# Patient Record
Sex: FEMALE | URBAN - METROPOLITAN AREA
[De-identification: names, ages, dates, MRNs, and addresses within clinical notes are randomized per-mention and may not be internally consistent; named-entity substitution may affect disease eponyms.]

---

## 2018-10-19 ENCOUNTER — APPOINTMENT (RX ONLY)
Dept: URBAN - METROPOLITAN AREA CLINIC 116 | Facility: CLINIC | Age: 78
Setting detail: DERMATOLOGY
End: 2018-10-19

## 2018-10-19 DIAGNOSIS — L82.1 OTHER SEBORRHEIC KERATOSIS: ICD-10-CM

## 2018-10-19 PROCEDURE — ? COUNSELING

## 2018-10-19 PROCEDURE — 99202 OFFICE O/P NEW SF 15 MIN: CPT

## 2018-10-19 ASSESSMENT — LOCATION DETAILED DESCRIPTION DERM: LOCATION DETAILED: RIGHT SUPERIOR MEDIAL MIDBACK

## 2018-10-19 ASSESSMENT — LOCATION SIMPLE DESCRIPTION DERM: LOCATION SIMPLE: RIGHT LOWER BACK

## 2018-10-19 ASSESSMENT — LOCATION ZONE DERM: LOCATION ZONE: TRUNK

## 2021-01-01 ENCOUNTER — OFFICE VISIT (OUTPATIENT)
Age: 81
End: 2021-01-01

## 2021-02-01 ENCOUNTER — OFFICE VISIT (OUTPATIENT)
Age: 81
End: 2021-02-01

## 2021-09-01 ENCOUNTER — OFFICE VISIT (OUTPATIENT)
Age: 81
End: 2021-09-01

## 2021-10-12 ENCOUNTER — OFFICE VISIT (OUTPATIENT)
Dept: URBAN - METROPOLITAN AREA CLINIC 9 | Facility: CLINIC | Age: 81
End: 2021-10-12

## 2021-12-07 ENCOUNTER — OFFICE VISIT (OUTPATIENT)
Dept: URBAN - METROPOLITAN AREA CLINIC 9 | Facility: CLINIC | Age: 81
End: 2021-12-07

## 2022-07-30 ENCOUNTER — TELEPHONE ENCOUNTER (OUTPATIENT)
Age: 82
End: 2022-07-30

## 2022-07-31 ENCOUNTER — TELEPHONE ENCOUNTER (OUTPATIENT)
Age: 82
End: 2022-07-31

## 2022-07-31 RX ORDER — FAMOTIDINE 20 MG/1
1 TABLET ORAL
Qty: 0 | Refills: 5 | Status: ACTIVE | COMMUNITY
Start: 2021-12-07

## 2022-07-31 RX ORDER — FAMOTIDINE 20 MG/1
1 TABLET ORAL DAILY
Qty: 0 | Refills: 5 | Status: ACTIVE | COMMUNITY

## 2022-07-31 RX ORDER — FAMOTIDINE 20 MG/1
1 TABLET ORAL DAILY
Qty: 0 | Refills: 5 | Status: ACTIVE | COMMUNITY
Start: 2021-10-12

## 2022-07-31 RX ORDER — SUCRALFATE 1 G/10ML
10 SUSPENSION ORAL
Qty: 0 | Refills: 2 | Status: ACTIVE | COMMUNITY
Start: 2021-12-07

## 2022-10-20 ENCOUNTER — TELEPHONE ENCOUNTER (OUTPATIENT)
Dept: URBAN - METROPOLITAN AREA CLINIC 7 | Facility: CLINIC | Age: 82
End: 2022-10-20

## 2023-04-18 ENCOUNTER — TELEMEDICINE (OUTPATIENT)
Dept: PRIMARY CARE | Facility: CLINIC | Age: 83
End: 2023-04-18
Payer: MEDICARE

## 2023-04-18 DIAGNOSIS — R19.7 DIARRHEA, UNSPECIFIED TYPE: Primary | ICD-10-CM

## 2023-04-18 PROCEDURE — 99213 OFFICE O/P EST LOW 20 MIN: CPT | Performed by: STUDENT IN AN ORGANIZED HEALTH CARE EDUCATION/TRAINING PROGRAM

## 2023-04-18 RX ORDER — MEMANTINE HYDROCHLORIDE 10 MG/1
TABLET ORAL 2 TIMES DAILY
COMMUNITY
Start: 2017-07-06 | End: 2023-07-25

## 2023-04-18 RX ORDER — OMEPRAZOLE 20 MG/1
1 CAPSULE, DELAYED RELEASE ORAL DAILY
COMMUNITY
Start: 2022-06-01 | End: 2023-06-20

## 2023-04-18 RX ORDER — LEVOTHYROXINE SODIUM 25 UG/1
1 TABLET ORAL DAILY
COMMUNITY
Start: 2014-04-09 | End: 2023-07-25

## 2023-04-18 RX ORDER — DONEPEZIL HYDROCHLORIDE 10 MG/1
1 TABLET, FILM COATED ORAL DAILY
COMMUNITY
Start: 2019-04-08 | End: 2023-09-21 | Stop reason: SDUPTHER

## 2023-04-18 RX ORDER — ATORVASTATIN CALCIUM 10 MG/1
1 TABLET, FILM COATED ORAL DAILY
COMMUNITY
Start: 2014-09-04 | End: 2023-09-21 | Stop reason: SDUPTHER

## 2023-04-18 RX ORDER — LORATADINE 10 MG/1
1 TABLET ORAL DAILY
COMMUNITY
Start: 2019-09-16

## 2023-04-18 ASSESSMENT — ENCOUNTER SYMPTOMS
CONSTIPATION: 0
FEVER: 0
RHINORRHEA: 0
DIZZINESS: 0
ABDOMINAL PAIN: 0
HEADACHES: 0
CHILLS: 0
SHORTNESS OF BREATH: 0
PALPITATIONS: 0
VOMITING: 0
NAUSEA: 0
FATIGUE: 0
DIARRHEA: 1
DYSURIA: 0
COUGH: 0
LIGHT-HEADEDNESS: 0

## 2023-04-18 NOTE — PROGRESS NOTES
Subjective   Patient ID: Salina Malik is a 83 y.o. female who presents for Diarrhea (Started last night,  getting worse ).    Diarrhea   Pertinent negatives include no abdominal pain, chills, coughing, fever, headaches or vomiting.        She has been having some stomach ache and then some diarrhea most of the day today.  Last night she took her medication with some mouthwash that she accidentally took them with.  Her  gave her one of his hyoscyamine tablets which have not seemed to help  She has had multiple episodes of diarrhea so far today.      Review of Systems   Constitutional:  Negative for chills, fatigue and fever.   HENT:  Negative for congestion and rhinorrhea.    Respiratory:  Negative for cough and shortness of breath.    Cardiovascular:  Negative for chest pain and palpitations.   Gastrointestinal:  Positive for diarrhea. Negative for abdominal pain, constipation, nausea and vomiting.   Genitourinary:  Negative for decreased urine volume and dysuria.   Neurological:  Negative for dizziness, light-headedness and headaches.       Objective   There were no vitals taken for this visit.    Physical Exam    Assessment/Plan   Problem List Items Addressed This Visit    None  Visit Diagnoses       Diarrhea, unspecified type    -  Primary          Patient with unspecified diarrhea.  No recent antibiotic use or other risk factors for C. difficile that would need to be tested.   states that he has not been having her drink as much fluids as it seems to go right through her due to the diarrhea.  Encouraged adequate hydration and discussed hydration formulas particularly with Pedialyte over-the-counter to keep up with fluid losses.  Discussed that if she is not able to take anything in by mouth he would need to go to the emergency department so she could receive IV fluids.  Discussed dietary changes that can be made to help with her diarrhea.  She is not having any blood or mucus in her stool.   Discussed over-the-counter fiber supplementation as well to help bulk up the stool as well as certain foods to avoid.  Discussed that they can start with over-the-counter treatment with Pepto-Bismol and Imodium if they need to but would forego this for now.  They did not want any prescription management at this time.  Discussed signs and symptoms and worsening diarrhea that would require immediate attention in the emergency department versus reevaluation in the office.  They are understanding and agreeable with this plan.    This visit was completed via telemedicine (video/telephone)call due to the restrictions of the COVID global pandemic. All issues were discussed and addressed as in the clinical documentation, but no physical exam was performed. If it was felt that the patient should be evaluated in a clinical setting, then they were directed there. The patient verbally consented to the telehealth visit.  Spent 15 minutes with the patient over the telemedicine call and more than 50% of this time was spent in counseling and coordination of care.

## 2023-04-25 ENCOUNTER — OFFICE VISIT (OUTPATIENT)
Dept: PRIMARY CARE | Facility: CLINIC | Age: 83
End: 2023-04-25
Payer: MEDICARE

## 2023-04-25 ENCOUNTER — TELEPHONE (OUTPATIENT)
Dept: PRIMARY CARE | Facility: CLINIC | Age: 83
End: 2023-04-25

## 2023-04-25 VITALS
HEIGHT: 63 IN | SYSTOLIC BLOOD PRESSURE: 128 MMHG | HEART RATE: 76 BPM | DIASTOLIC BLOOD PRESSURE: 70 MMHG | WEIGHT: 164 LBS | OXYGEN SATURATION: 97 % | BODY MASS INDEX: 29.06 KG/M2 | TEMPERATURE: 96.5 F

## 2023-04-25 DIAGNOSIS — B37.2 CANDIDAL DERMATITIS: Primary | ICD-10-CM

## 2023-04-25 DIAGNOSIS — R73.09 ELEVATED GLUCOSE: ICD-10-CM

## 2023-04-25 DIAGNOSIS — F03.918 DEMENTIA WITH BEHAVIORAL DISTURBANCE (MULTI): ICD-10-CM

## 2023-04-25 DIAGNOSIS — K21.9 GASTROESOPHAGEAL REFLUX DISEASE WITHOUT ESOPHAGITIS: ICD-10-CM

## 2023-04-25 DIAGNOSIS — E78.5 HYPERLIPIDEMIA, UNSPECIFIED HYPERLIPIDEMIA TYPE: ICD-10-CM

## 2023-04-25 DIAGNOSIS — N30.00 ACUTE CYSTITIS WITHOUT HEMATURIA: ICD-10-CM

## 2023-04-25 DIAGNOSIS — E03.9 HYPOTHYROIDISM, UNSPECIFIED TYPE: ICD-10-CM

## 2023-04-25 PROBLEM — F03.90 DEMENTIA (MULTI): Status: ACTIVE | Noted: 2023-04-25

## 2023-04-25 PROBLEM — D17.1 LIPOMA OF TORSO: Status: ACTIVE | Noted: 2018-06-21

## 2023-04-25 PROBLEM — Z96.642 HISTORY OF LEFT HIP HEMIARTHROPLASTY: Status: ACTIVE | Noted: 2020-06-03

## 2023-04-25 PROBLEM — G30.9 ALZHEIMER'S DEMENTIA WITH BEHAVIORAL DISTURBANCE (MULTI): Status: ACTIVE | Noted: 2019-04-26

## 2023-04-25 PROBLEM — N39.0 UTI (URINARY TRACT INFECTION): Status: ACTIVE | Noted: 2023-04-25

## 2023-04-25 PROBLEM — L60.0 INGROWING NAIL: Status: ACTIVE | Noted: 2023-04-25

## 2023-04-25 PROBLEM — M85.80 OSTEOPENIA: Status: ACTIVE | Noted: 2023-04-25

## 2023-04-25 PROBLEM — N60.12 FIBROCYSTIC BREAST CHANGES OF BOTH BREASTS: Status: ACTIVE | Noted: 2017-09-12

## 2023-04-25 PROBLEM — N63.20 LEFT BREAST LUMP: Status: ACTIVE | Noted: 2023-04-25

## 2023-04-25 PROBLEM — E03.8 OTHER SPECIFIED HYPOTHYROIDISM: Status: ACTIVE | Noted: 2018-11-15

## 2023-04-25 PROBLEM — K31.7 GASTRIC POLYPS: Status: ACTIVE | Noted: 2023-04-25

## 2023-04-25 PROBLEM — N60.11 FIBROCYSTIC BREAST CHANGES OF BOTH BREASTS: Status: ACTIVE | Noted: 2017-09-12

## 2023-04-25 PROBLEM — F02.818 ALZHEIMER'S DEMENTIA WITH BEHAVIORAL DISTURBANCE (MULTI): Status: ACTIVE | Noted: 2019-04-26

## 2023-04-25 PROBLEM — K59.00 CONSTIPATION: Status: ACTIVE | Noted: 2018-11-15

## 2023-04-25 PROBLEM — J18.9 PNEUMONIA: Status: ACTIVE | Noted: 2022-12-02

## 2023-04-25 PROBLEM — S72.002A CLOSED DISPLACED FRACTURE OF LEFT FEMORAL NECK (MULTI): Status: ACTIVE | Noted: 2020-05-02

## 2023-04-25 PROBLEM — R42 DIZZINESS: Status: ACTIVE | Noted: 2019-04-26

## 2023-04-25 PROBLEM — M10.9 ACUTE GOUT OF LEFT WRIST: Status: ACTIVE | Noted: 2023-04-25

## 2023-04-25 PROBLEM — E78.00 HYPERCHOLESTEROLEMIA: Status: ACTIVE | Noted: 2018-11-15

## 2023-04-25 PROBLEM — M25.552 LEFT HIP PAIN: Status: ACTIVE | Noted: 2023-04-25

## 2023-04-25 PROBLEM — E78.2 HYPERLIPIDEMIA, MIXED: Status: ACTIVE | Noted: 2019-04-26

## 2023-04-25 PROBLEM — M72.2 PLANTAR FASCIITIS: Status: ACTIVE | Noted: 2023-04-25

## 2023-04-25 PROBLEM — R31.29 OTHER MICROSCOPIC HEMATURIA: Status: ACTIVE | Noted: 2023-04-25

## 2023-04-25 PROBLEM — R41.3 MEMORY LOSS: Status: ACTIVE | Noted: 2023-04-25

## 2023-04-25 PROCEDURE — 1036F TOBACCO NON-USER: CPT | Performed by: FAMILY MEDICINE

## 2023-04-25 PROCEDURE — 1160F RVW MEDS BY RX/DR IN RCRD: CPT | Performed by: FAMILY MEDICINE

## 2023-04-25 PROCEDURE — 1159F MED LIST DOCD IN RCRD: CPT | Performed by: FAMILY MEDICINE

## 2023-04-25 PROCEDURE — 99214 OFFICE O/P EST MOD 30 MIN: CPT | Performed by: FAMILY MEDICINE

## 2023-04-25 RX ORDER — CLOTRIMAZOLE AND BETAMETHASONE DIPROPIONATE 10; .64 MG/G; MG/G
CREAM TOPICAL
COMMUNITY
End: 2023-04-25 | Stop reason: SDUPTHER

## 2023-04-25 RX ORDER — CLOTRIMAZOLE AND BETAMETHASONE DIPROPIONATE 10; .64 MG/G; MG/G
CREAM TOPICAL
Qty: 45 G | Refills: 0 | Status: SHIPPED | OUTPATIENT
Start: 2023-04-25 | End: 2023-06-20

## 2023-04-25 RX ORDER — CALCIUM CARBONATE/VITAMIN D3 600MG-5MCG
TABLET ORAL
COMMUNITY
End: 2023-06-20

## 2023-04-25 RX ORDER — CALCIUM CARBONATE 200(500)MG
TABLET,CHEWABLE ORAL
COMMUNITY

## 2023-04-25 RX ORDER — CLOTRIMAZOLE AND BETAMETHASONE DIPROPIONATE 10; .64 MG/G; MG/G
CREAM TOPICAL
Qty: 15 G | Refills: 0 | Status: SHIPPED | OUTPATIENT
Start: 2023-04-25 | End: 2023-04-25 | Stop reason: SDUPTHER

## 2023-04-25 RX ORDER — OMEPRAZOLE 20 MG/1
20 TABLET, DELAYED RELEASE ORAL
COMMUNITY
End: 2023-04-25 | Stop reason: ALTCHOICE

## 2023-04-25 RX ORDER — FAMOTIDINE 20 MG/1
TABLET, FILM COATED ORAL EVERY 12 HOURS
COMMUNITY
End: 2023-04-25 | Stop reason: SDUPTHER

## 2023-04-25 RX ORDER — ACETAMINOPHEN 325 MG/1
650 TABLET ORAL EVERY 6 HOURS PRN
COMMUNITY
Start: 2020-05-06

## 2023-04-25 RX ORDER — FAMOTIDINE 20 MG/1
20 TABLET, FILM COATED ORAL 2 TIMES DAILY
Qty: 60 TABLET | Refills: 1 | Status: SHIPPED | OUTPATIENT
Start: 2023-04-25 | End: 2023-06-20 | Stop reason: SDUPTHER

## 2023-04-25 RX ORDER — CALCIUM CARBONATE 600 MG
600 TABLET ORAL
COMMUNITY
End: 2023-06-20

## 2023-04-25 RX ORDER — ASPIRIN 81 MG/1
TABLET ORAL
COMMUNITY
End: 2023-06-20

## 2023-04-25 RX ORDER — BENZONATATE 100 MG/1
CAPSULE ORAL EVERY 8 HOURS
COMMUNITY
End: 2023-06-20

## 2023-04-25 RX ORDER — PHENAZOPYRIDINE HYDROCHLORIDE 200 MG/1
TABLET, FILM COATED ORAL EVERY 8 HOURS
COMMUNITY
End: 2023-06-20

## 2023-04-25 RX ORDER — GUAIFENESIN 1200 MG/1
1200 TABLET, EXTENDED RELEASE ORAL 2 TIMES DAILY
COMMUNITY
Start: 2022-11-12 | End: 2023-06-20

## 2023-04-25 RX ORDER — CLOTRIMAZOLE AND BETAMETHASONE DIPROPIONATE 10; .64 MG/G; MG/G
CREAM TOPICAL
Qty: 45 G | Refills: 0 | Status: SHIPPED | OUTPATIENT
Start: 2023-04-25 | End: 2023-04-25 | Stop reason: SDUPTHER

## 2023-04-25 ASSESSMENT — ENCOUNTER SYMPTOMS
ABDOMINAL DISTENTION: 0
ANAL BLEEDING: 0
BLOOD IN STOOL: 0
CARDIOVASCULAR NEGATIVE: 1
DIARRHEA: 1
DEPRESSION: 1
OCCASIONAL FEELINGS OF UNSTEADINESS: 1
NAUSEA: 0
LOSS OF SENSATION IN FEET: 0
VOMITING: 0
CONSTIPATION: 0
RESPIRATORY NEGATIVE: 1

## 2023-04-25 ASSESSMENT — PATIENT HEALTH QUESTIONNAIRE - PHQ9
SUM OF ALL RESPONSES TO PHQ9 QUESTIONS 1 AND 2: 0
1. LITTLE INTEREST OR PLEASURE IN DOING THINGS: NOT AT ALL
2. FEELING DOWN, DEPRESSED OR HOPELESS: NOT AT ALL

## 2023-04-25 NOTE — PATIENT INSTRUCTIONS
Treating for Candida dermatitis.  Prescription for cream was sent to pharmacy.    Trying Pepcid for GE reflux.    Lab studies are going to be performed including CMP, CBC, lipids, TSH, hemoglobin A1c, amylase and lipase.  Urinalysis also being performed because of nausea.

## 2023-04-25 NOTE — PROGRESS NOTES
"Subjective   Patient ID: Salina Malik is a 83 y.o. female who presents for Dementia (Rash under breasts ; nausea and intermittent diarrhea).    Memory Loss     Nausea and intermiitant diarrhea.  A week of pneumonia Some nausea.  Patient had no weight loss.  No actual vomiting.    Has been taking omeprazole but they would like to do the famotidine.  Has had a little bit of rash underneath the breasts.    Remains with dementia.    No have been admitted to.  Mention of chest pain or shortness of breath.  No swelling of the legs or feet.  No numbness or tingling of the legs or feet  Breast rash  No weight loss.      Review of Systems   Respiratory: Negative.     Cardiovascular: Negative.    Gastrointestinal:  Positive for diarrhea. Negative for abdominal distention, anal bleeding, blood in stool, constipation, nausea and vomiting.       Objective   /70   Pulse 76   Temp 35.8 °C (96.5 °F)   Ht 1.6 m (5' 3\")   Wt 74.4 kg (164 lb)   SpO2 97%   BMI 29.05 kg/m²   BSA Body surface area is 1.82 meters squared.      Physical Exam  Constitutional:       Appearance: Normal appearance.   HENT:      Head: Normocephalic and atraumatic.   Cardiovascular:      Rate and Rhythm: Normal rate and regular rhythm.      Pulses: Normal pulses.   Abdominal:      General: Abdomen is flat.      Palpations: Abdomen is soft.      Comments: Note tenderness with palpation.   Musculoskeletal:      Cervical back: Normal range of motion.   Skin:     Comments: Consistent with Candida dermatitis.  No drainage no warmth noted.    Elevation.  Some erythema noted beneath the breast bilaterally.   Neurological:      General: No focal deficit present.      Mental Status: She is alert. Mental status is at baseline.   Psychiatric:         Mood and Affect: Mood normal.         Behavior: Behavior normal.     [facility] Encounter on 09/01/2022   Component Date Value Ref Range Status   • Cholesterol 09/01/2022 177  0 - 199 mg/dL Final    Comment: .      " AGE      DESIRABLE   BORDERLINE HIGH   HIGH     0-19 Y     0 - 169       170 - 199     >/= 200    20-24 Y     0 - 189       190 - 224     >/= 225         >24 Y     0 - 199       200 - 239     >/= 240   **All ranges are based on fasting samples. Specific   therapeutic targets will vary based on patient-specific   cardiac risk.  .   Pediatric guidelines reference:Pediatrics 2011, 128(S5).   Adult guidelines reference: NCEP ATPIII Guidelines,     MELINDA 2001, 258:2486-97  .   Venipuncture immediately after or during the    administration of Metamizole may lead to falsely   low results. Testing should be performed immediately   prior to Metamizole dosing.     • HDL 09/01/2022 62.5  mg/dL Final    Comment: .      AGE      VERY LOW   LOW     NORMAL    HIGH       0-19 Y       < 35   < 40     40-45     ----    20-24 Y       ----   < 40       >45     ----      >24 Y       ----   < 40     40-60      >60  .     • Cholesterol/HDL Ratio 09/01/2022 2.8   Final    Comment: REF VALUES  DESIRABLE  < 3.4  HIGH RISK  > 5.0     • LDL 09/01/2022 99  0 - 99 mg/dL Final    Comment: .                           NEAR      BORD      AGE      DESIRABLE  OPTIMAL    HIGH     HIGH     VERY HIGH     0-19 Y     0 - 109     ---    110-129   >/= 130     ----    20-24 Y     0 - 119     ---    120-159   >/= 160     ----      >24 Y     0 -  99   100-129  130-159   160-189     >/=190  .     • VLDL 09/01/2022 16  0 - 40 mg/dL Final   • Triglycerides 09/01/2022 80  0 - 149 mg/dL Final    Comment: .      AGE      DESIRABLE   BORDERLINE HIGH   HIGH     VERY HIGH   0 D-90 D    19 - 174         ----         ----        ----  91 D- 9 Y     0 -  74        75 -  99     >/= 100      ----    10-19 Y     0 -  89        90 - 129     >/= 130      ----    20-24 Y     0 - 114       115 - 149     >/= 150      ----         >24 Y     0 - 149       150 - 199    200- 499    >/= 500  .   Venipuncture immediately after or during the    administration of Metamizole may lead to  falsely   low results. Testing should be performed immediately   prior to Metamizole dosing.     • T4, Total 09/01/2022 6.7  4.5 - 11.1 ug/dL Final   • T3, Total 09/01/2022 121  60 - 200 ng/dL Final   • Glucose 09/01/2022 96  74 - 99 mg/dL Final   • Sodium 09/01/2022 142  136 - 145 mmol/L Final   • Potassium 09/01/2022 4.3  3.5 - 5.3 mmol/L Final   • Chloride 09/01/2022 109 (H)  98 - 107 mmol/L Final   • Bicarbonate 09/01/2022 26  21 - 32 mmol/L Final   • Anion Gap 09/01/2022 11  10 - 20 mmol/L Final   • Urea Nitrogen 09/01/2022 18  6 - 23 mg/dL Final   • Creatinine 09/01/2022 0.78  0.50 - 1.05 mg/dL Final   • GFR Female 09/01/2022 75  >90 mL/min/1.73m2 Final    Comment:  CALCULATIONS OF ESTIMATED GFR ARE PERFORMED   USING THE 2021 CKD-EPI STUDY REFIT EQUATION   WITHOUT THE RACE VARIABLE FOR THE IDMS-TRACEABLE   CREATININE METHODS.    https://jasn.asnjournals.org/content/early/2021/09/22/ASN.4448662522     • Calcium 09/01/2022 9.4  8.6 - 10.6 mg/dL Final   • Albumin 09/01/2022 4.3  3.4 - 5.0 g/dL Final   • Alkaline Phosphatase 09/01/2022 110  33 - 136 U/L Final   • Total Protein 09/01/2022 6.4  6.4 - 8.2 g/dL Final   • AST 09/01/2022 16  9 - 39 U/L Final   • Total Bilirubin 09/01/2022 0.4  0.0 - 1.2 mg/dL Final   • ALT (SGPT) 09/01/2022 15  7 - 45 U/L Final    Comment:  Patients treated with Sulfasalazine may generate    falsely decreased results for ALT.     • WBC 09/01/2022 5.1  4.4 - 11.3 x10E9/L Final   • nRBC 09/01/2022 0.0  0.0 - 0.0 /100 WBC Final   • RBC 09/01/2022 4.36  4.00 - 5.20 x10E12/L Final   • Hemoglobin 09/01/2022 12.4  12.0 - 16.0 g/dL Final   • Hematocrit 09/01/2022 40.4  36.0 - 46.0 % Final   • MCV 09/01/2022 93  80 - 100 fL Final   • MCHC 09/01/2022 30.7 (L)  32.0 - 36.0 g/dL Final   • Platelets 09/01/2022 361  150 - 450 x10E9/L Final   • RDW 09/01/2022 14.4  11.5 - 14.5 % Final   • Neutrophils % 09/01/2022 62.6  40.0 - 80.0 % Final   • Immature Granulocytes %, Automated 09/01/2022 0.2  0.0 -  0.9 % Final    Comment:  Immature Granulocyte Count (IG) includes promyelocytes,    myelocytes and metamyelocytes but does not include bands.   Percent differential counts (%) should be interpreted in the   context of the absolute cell counts (cells/L).     • Lymphocytes % 09/01/2022 23.9  13.0 - 44.0 % Final   • Monocytes % 09/01/2022 8.6  2.0 - 10.0 % Final   • Eosinophils % 09/01/2022 3.3  0.0 - 6.0 % Final   • Basophils % 09/01/2022 1.4  0.0 - 2.0 % Final   • Neutrophils Absolute 09/01/2022 3.22  1.60 - 5.50 x10E9/L Final   • Lymphocytes Absolute 09/01/2022 1.23  0.80 - 3.00 x10E9/L Final   • Monocytes Absolute 09/01/2022 0.44  0.05 - 0.80 x10E9/L Final   • Eosinophils Absolute 09/01/2022 0.17  0.00 - 0.40 x10E9/L Final   • Basophils Absolute 09/01/2022 0.07  0.00 - 0.10 x10E9/L Final   • TSH 09/01/2022 4.34 (H)  0.44 - 3.98 mIU/L Final    Comment:  TSH testing is performed using different testing    methodology at [facility] than at other    [facility]. Direct result comparisons should    only be made within the same method.       Current Outpatient Medications on File Prior to Visit   Medication Sig Dispense Refill   • acetaminophen (Tylenol) 325 mg tablet Take 2 tablets (650 mg) by mouth every 6 hours if needed.     • atorvastatin (Lipitor) 10 mg tablet Take 1 tablet (10 mg) by mouth once daily.     • calcium carbonate (Tums) 200 mg calcium chewable tablet Tums 200 mg calcium (500 mg) chewable tablet   Take by oral route.     • clotrimazole-betamethasone (Lotrisone) cream clotrimazole-betamethasone 1 %-0.05 % topical cream   APPLY TO THE AFFECTED AND SURROUNDING AREAS OF SKIN BY TOPICAL ROUTE 2 TIMES PER DAY IN THE MORNING AND EVENING FOR 2 WEEKS     • donepezil (Aricept) 10 mg tablet Take 1 tablet (10 mg) by mouth once daily.     • levothyroxine (Synthroid, Levoxyl) 25 mcg tablet Take 1 tablet (25 mcg) by mouth once daily.     • loratadine (Claritin) 10 mg tablet Take 1 tablet (10 mg) by  mouth once daily.     • memantine (Namenda) 10 mg tablet Take by mouth twice a day.     • multivitamin capsule Take 1 capsule by mouth once daily.     • omeprazole (PriLOSEC) 20 mg DR capsule Take 1 capsule (20 mg) by mouth once daily.     • alendronate 70 mg tablet, effervescent alendronate 70 mg effervescent tablet   Take 1 tablet every week by oral route.     • aspirin 81 mg EC tablet once daily.     • benzonatate (Tessalon) 100 mg capsule every 8 hours.     • calcium carbonate 600 mg calcium (1,500 mg) tablet Take 1 tablet (600 mg) by mouth.     • calcium carbonate-vitamin D3 600 mg-5 mcg (200 unit) tablet Take by mouth.     • famotidine (Pepcid) 20 mg tablet every 12 hours.     • guaiFENesin (Mucinex) 1,200 mg tablet extended release 12hr Take 1 tablet (1,200 mg) by mouth in the morning and 1 tablet (1,200 mg) in the evening.     • omeprazole OTC (PriLOSEC OTC) 20 mg EC tablet Take 1 tablet (20 mg) by mouth once daily.     • phenazopyridine (Pyridium) 200 mg tablet every 8 hours.       No current facility-administered medications on file prior to visit.     No images are attached to the encounter.            Assessment/Plan   Problem List Items Addressed This Visit          Digestive    Gastroesophageal reflux disease without esophagitis    Relevant Medications    famotidine (Pepcid) 20 mg tablet       Infectious/Inflammatory    Candidal dermatitis - Primary    Relevant Medications    clotrimazole-betamethasone (Lotrisone) cream

## 2023-04-27 ENCOUNTER — LAB (OUTPATIENT)
Dept: LAB | Facility: LAB | Age: 83
End: 2023-04-27
Payer: MEDICARE

## 2023-04-27 DIAGNOSIS — R73.09 ELEVATED GLUCOSE: ICD-10-CM

## 2023-04-27 DIAGNOSIS — E78.5 HYPERLIPIDEMIA, UNSPECIFIED HYPERLIPIDEMIA TYPE: ICD-10-CM

## 2023-04-27 DIAGNOSIS — E03.9 HYPOTHYROIDISM, UNSPECIFIED TYPE: ICD-10-CM

## 2023-04-27 DIAGNOSIS — N30.00 ACUTE CYSTITIS WITHOUT HEMATURIA: ICD-10-CM

## 2023-04-27 DIAGNOSIS — K21.9 GASTROESOPHAGEAL REFLUX DISEASE WITHOUT ESOPHAGITIS: ICD-10-CM

## 2023-04-27 LAB
ALANINE AMINOTRANSFERASE (SGPT) (U/L) IN SER/PLAS: 17 U/L (ref 7–45)
ALBUMIN (G/DL) IN SER/PLAS: 4.2 G/DL (ref 3.4–5)
ALKALINE PHOSPHATASE (U/L) IN SER/PLAS: 96 U/L (ref 33–136)
AMYLASE (U/L) IN SER/PLAS: 40 U/L (ref 29–103)
ANION GAP IN SER/PLAS: 11 MMOL/L (ref 10–20)
APPEARANCE, URINE: CLEAR
ASPARTATE AMINOTRANSFERASE (SGOT) (U/L) IN SER/PLAS: 19 U/L (ref 9–39)
BASOPHILS (10*3/UL) IN BLOOD BY AUTOMATED COUNT: 0.07 X10E9/L (ref 0–0.1)
BASOPHILS/100 LEUKOCYTES IN BLOOD BY AUTOMATED COUNT: 1.2 % (ref 0–2)
BILIRUBIN TOTAL (MG/DL) IN SER/PLAS: 0.6 MG/DL (ref 0–1.2)
BILIRUBIN, URINE: NEGATIVE
BLOOD, URINE: NEGATIVE
CALCIUM (MG/DL) IN SER/PLAS: 9.6 MG/DL (ref 8.6–10.6)
CARBON DIOXIDE, TOTAL (MMOL/L) IN SER/PLAS: 28 MMOL/L (ref 21–32)
CHLORIDE (MMOL/L) IN SER/PLAS: 109 MMOL/L (ref 98–107)
CHOLESTEROL (MG/DL) IN SER/PLAS: 167 MG/DL (ref 0–199)
CHOLESTEROL IN HDL (MG/DL) IN SER/PLAS: 64.1 MG/DL
CHOLESTEROL/HDL RATIO: 2.6
COLOR, URINE: YELLOW
CREATININE (MG/DL) IN SER/PLAS: 0.77 MG/DL (ref 0.5–1.05)
EOSINOPHILS (10*3/UL) IN BLOOD BY AUTOMATED COUNT: 0.18 X10E9/L (ref 0–0.4)
EOSINOPHILS/100 LEUKOCYTES IN BLOOD BY AUTOMATED COUNT: 3.1 % (ref 0–6)
ERYTHROCYTE DISTRIBUTION WIDTH (RATIO) BY AUTOMATED COUNT: 14.7 % (ref 11.5–14.5)
ERYTHROCYTE MEAN CORPUSCULAR HEMOGLOBIN CONCENTRATION (G/DL) BY AUTOMATED: 30.8 G/DL (ref 32–36)
ERYTHROCYTE MEAN CORPUSCULAR VOLUME (FL) BY AUTOMATED COUNT: 94 FL (ref 80–100)
ERYTHROCYTES (10*6/UL) IN BLOOD BY AUTOMATED COUNT: 4.57 X10E12/L (ref 4–5.2)
ESTIMATED AVERAGE GLUCOSE FOR HBA1C: 126 MG/DL
GFR FEMALE: 76 ML/MIN/1.73M2
GLUCOSE (MG/DL) IN SER/PLAS: 101 MG/DL (ref 74–99)
GLUCOSE, URINE: NEGATIVE MG/DL
HEMATOCRIT (%) IN BLOOD BY AUTOMATED COUNT: 42.8 % (ref 36–46)
HEMOGLOBIN (G/DL) IN BLOOD: 13.2 G/DL (ref 12–16)
HEMOGLOBIN A1C/HEMOGLOBIN TOTAL IN BLOOD: 6 %
IMMATURE GRANULOCYTES/100 LEUKOCYTES IN BLOOD BY AUTOMATED COUNT: 0.2 % (ref 0–0.9)
KETONES, URINE: NEGATIVE MG/DL
LDL: 83 MG/DL (ref 0–99)
LEUKOCYTE ESTERASE, URINE: ABNORMAL
LEUKOCYTES (10*3/UL) IN BLOOD BY AUTOMATED COUNT: 5.8 X10E9/L (ref 4.4–11.3)
LIPASE (U/L) IN SER/PLAS: 22 U/L (ref 9–82)
LYMPHOCYTES (10*3/UL) IN BLOOD BY AUTOMATED COUNT: 1.22 X10E9/L (ref 0.8–3)
LYMPHOCYTES/100 LEUKOCYTES IN BLOOD BY AUTOMATED COUNT: 21.1 % (ref 13–44)
MONOCYTES (10*3/UL) IN BLOOD BY AUTOMATED COUNT: 0.53 X10E9/L (ref 0.05–0.8)
MONOCYTES/100 LEUKOCYTES IN BLOOD BY AUTOMATED COUNT: 9.2 % (ref 2–10)
MUCUS, URINE: NORMAL /LPF
NEUTROPHILS (10*3/UL) IN BLOOD BY AUTOMATED COUNT: 3.76 X10E9/L (ref 1.6–5.5)
NEUTROPHILS/100 LEUKOCYTES IN BLOOD BY AUTOMATED COUNT: 65.2 % (ref 40–80)
NITRITE, URINE: NEGATIVE
NRBC (PER 100 WBCS) BY AUTOMATED COUNT: 0 /100 WBC (ref 0–0)
PH, URINE: 6 (ref 5–8)
PLATELETS (10*3/UL) IN BLOOD AUTOMATED COUNT: 334 X10E9/L (ref 150–450)
POTASSIUM (MMOL/L) IN SER/PLAS: 4.5 MMOL/L (ref 3.5–5.3)
PROTEIN TOTAL: 6.5 G/DL (ref 6.4–8.2)
PROTEIN, URINE: NEGATIVE MG/DL
RBC, URINE: <1 /HPF (ref 0–5)
SODIUM (MMOL/L) IN SER/PLAS: 143 MMOL/L (ref 136–145)
SPECIFIC GRAVITY, URINE: 1.01 (ref 1–1.03)
SQUAMOUS EPITHELIAL CELLS, URINE: 1 /HPF
THYROTROPIN (MIU/L) IN SER/PLAS BY DETECTION LIMIT <= 0.05 MIU/L: 3.21 MIU/L (ref 0.44–3.98)
TRIGLYCERIDE (MG/DL) IN SER/PLAS: 100 MG/DL (ref 0–149)
UREA NITROGEN (MG/DL) IN SER/PLAS: 12 MG/DL (ref 6–23)
UROBILINOGEN, URINE: <2 MG/DL (ref 0–1.9)
VLDL: 20 MG/DL (ref 0–40)
WBC, URINE: 2 /HPF (ref 0–5)

## 2023-04-27 PROCEDURE — 80061 LIPID PANEL: CPT

## 2023-04-27 PROCEDURE — 83690 ASSAY OF LIPASE: CPT

## 2023-04-27 PROCEDURE — 36415 COLL VENOUS BLD VENIPUNCTURE: CPT

## 2023-04-27 PROCEDURE — 84443 ASSAY THYROID STIM HORMONE: CPT

## 2023-04-27 PROCEDURE — 80053 COMPREHEN METABOLIC PANEL: CPT

## 2023-04-27 PROCEDURE — 85025 COMPLETE CBC W/AUTO DIFF WBC: CPT

## 2023-04-27 PROCEDURE — 83036 HEMOGLOBIN GLYCOSYLATED A1C: CPT

## 2023-04-27 PROCEDURE — 82150 ASSAY OF AMYLASE: CPT

## 2023-04-27 PROCEDURE — 81001 URINALYSIS AUTO W/SCOPE: CPT

## 2023-05-01 ENCOUNTER — TELEPHONE (OUTPATIENT)
Dept: PRIMARY CARE | Facility: CLINIC | Age: 83
End: 2023-05-01
Payer: MEDICARE

## 2023-05-01 NOTE — TELEPHONE ENCOUNTER
Pt's  Cirilo stopped in to advise Salina would like to proceed with having the US.  He believes it was of the stomach.    Cirilo is asking if we could get that scheduled for her.  Call Cirilo with the information.    Cirilo # [phone]

## 2023-05-03 DIAGNOSIS — K31.7 GASTRIC POLYPS: ICD-10-CM

## 2023-05-03 DIAGNOSIS — K59.00 CONSTIPATION, UNSPECIFIED CONSTIPATION TYPE: ICD-10-CM

## 2023-06-20 ENCOUNTER — OFFICE VISIT (OUTPATIENT)
Dept: PRIMARY CARE | Facility: CLINIC | Age: 83
End: 2023-06-20
Payer: MEDICARE

## 2023-06-20 VITALS
HEART RATE: 70 BPM | HEIGHT: 64 IN | SYSTOLIC BLOOD PRESSURE: 122 MMHG | WEIGHT: 164 LBS | DIASTOLIC BLOOD PRESSURE: 74 MMHG | TEMPERATURE: 97 F | BODY MASS INDEX: 28 KG/M2

## 2023-06-20 DIAGNOSIS — E78.00 HYPERCHOLESTEROLEMIA: ICD-10-CM

## 2023-06-20 DIAGNOSIS — K31.7 GASTRIC POLYPS: ICD-10-CM

## 2023-06-20 DIAGNOSIS — F03.918 DEMENTIA WITH BEHAVIORAL DISTURBANCE (MULTI): ICD-10-CM

## 2023-06-20 DIAGNOSIS — K21.9 GASTROESOPHAGEAL REFLUX DISEASE WITHOUT ESOPHAGITIS: ICD-10-CM

## 2023-06-20 DIAGNOSIS — F02.818 ALZHEIMER'S DEMENTIA WITH BEHAVIORAL DISTURBANCE (MULTI): ICD-10-CM

## 2023-06-20 DIAGNOSIS — F02.C0 SEVERE LATE ONSET ALZHEIMER'S DEMENTIA, UNSPECIFIED WHETHER BEHAVIORAL, PSYCHOTIC, OR MOOD DISTURBANCE OR ANXIETY (MULTI): Primary | ICD-10-CM

## 2023-06-20 DIAGNOSIS — G30.1 SEVERE LATE ONSET ALZHEIMER'S DEMENTIA, UNSPECIFIED WHETHER BEHAVIORAL, PSYCHOTIC, OR MOOD DISTURBANCE OR ANXIETY (MULTI): Primary | ICD-10-CM

## 2023-06-20 DIAGNOSIS — K21.00 GASTROESOPHAGEAL REFLUX DISEASE WITH ESOPHAGITIS, UNSPECIFIED WHETHER HEMORRHAGE: ICD-10-CM

## 2023-06-20 DIAGNOSIS — G30.9 ALZHEIMER'S DEMENTIA WITH BEHAVIORAL DISTURBANCE (MULTI): ICD-10-CM

## 2023-06-20 PROBLEM — F03.90 DEMENTIA (MULTI): Status: RESOLVED | Noted: 2023-04-25 | Resolved: 2023-06-20

## 2023-06-20 PROBLEM — S72.002A CLOSED DISPLACED FRACTURE OF LEFT FEMORAL NECK (MULTI): Status: RESOLVED | Noted: 2020-05-02 | Resolved: 2023-06-20

## 2023-06-20 PROCEDURE — 1159F MED LIST DOCD IN RCRD: CPT | Performed by: FAMILY MEDICINE

## 2023-06-20 PROCEDURE — 1036F TOBACCO NON-USER: CPT | Performed by: FAMILY MEDICINE

## 2023-06-20 PROCEDURE — 99213 OFFICE O/P EST LOW 20 MIN: CPT | Performed by: FAMILY MEDICINE

## 2023-06-20 PROCEDURE — 1160F RVW MEDS BY RX/DR IN RCRD: CPT | Performed by: FAMILY MEDICINE

## 2023-06-20 RX ORDER — FAMOTIDINE 20 MG/1
20 TABLET, FILM COATED ORAL 2 TIMES DAILY
Qty: 180 TABLET | Refills: 1 | Status: SHIPPED | OUTPATIENT
Start: 2023-06-20 | End: 2023-09-21 | Stop reason: SDUPTHER

## 2023-06-20 RX ORDER — KETOCONAZOLE 20 MG/ML
SHAMPOO, SUSPENSION TOPICAL
COMMUNITY
Start: 2023-05-15

## 2023-06-20 ASSESSMENT — ENCOUNTER SYMPTOMS
ARTHRALGIAS: 1
DIARRHEA: 0
HALLUCINATIONS: 0
AGITATION: 0
SINUS PRESSURE: 0
EYE PAIN: 0
ABDOMINAL DISTENTION: 0
BLOOD IN STOOL: 0
ACTIVITY CHANGE: 0
CARDIOVASCULAR NEGATIVE: 1
DIAPHORESIS: 0
MYALGIAS: 1
EYES NEGATIVE: 1
EYE ITCHING: 0
NAUSEA: 1
CHILLS: 0
JOINT SWELLING: 0
DIZZINESS: 0
RESPIRATORY NEGATIVE: 1

## 2023-06-20 NOTE — PATIENT INSTRUCTIONS
Okay to participate in activities but must have supervision to do exercise equipment.    Going to write for occupational therapy for patient as well.    She will do this once weekly along with physical therapy.    We are continuing famotidine at this time for GE reflux and gastric polyps.  We recommended to see GI specialist and you have seen to the do not recommend EGD to be performed.  If any worsening symptoms such as nausea, vomiting, lack of appetite or other change, please call and let us know.

## 2023-06-20 NOTE — PROGRESS NOTES
"Subjective   Patient ID: Salina Malik is a 83 y.o. female who presents for Follow-up (Medication ; fill out form to exercise ).    HPI Saw GI.  Patient with significant history of dementia they are trying to get over to do her exercise program.  She is working with physical therapy at home they wanted her to do occupational therapy as well.    Patient is at her baseline with dementia.    She has had no troubles with chest pain or shortness of breath that her  is noted.    She denies any complaints today but sometimes she complains of nausea.    Patient's had no troubles with abdominal pain or discomfort.  No troubles with swelling of the legs or feet.  No fever no chills or night sweats.    No troubles with vomiting.    No change in stool habits no weight loss.        Doing PT wants OT.    Help strengthening.        Dr. Crespo.    Not drinking enough.      Review of Systems   Constitutional:  Negative for activity change, chills and diaphoresis.   HENT:  Negative for congestion, dental problem, postnasal drip and sinus pressure.    Eyes: Negative.  Negative for pain and itching.   Respiratory: Negative.     Cardiovascular: Negative.    Gastrointestinal:  Positive for nausea. Negative for abdominal distention, blood in stool and diarrhea.   Endocrine: Negative for cold intolerance.   Musculoskeletal:  Positive for arthralgias and myalgias. Negative for joint swelling.   Neurological:  Negative for dizziness.   Psychiatric/Behavioral:  Negative for agitation, behavioral problems, hallucinations and suicidal ideas.        Objective   /74   Pulse 70   Temp 36.1 °C (97 °F)   Ht 1.626 m (5' 4\")   Wt 74.4 kg (164 lb)   BMI 28.15 kg/m²   BSA Body surface area is 1.83 meters squared.      Physical Exam  Constitutional:       General: She is not in acute distress.     Appearance: Normal appearance. She is not toxic-appearing.   HENT:      Nose: Nose normal.      Mouth/Throat:      Mouth: Mucous " membranes are moist.   Cardiovascular:      Rate and Rhythm: Normal rate and regular rhythm.      Pulses: Normal pulses.   Pulmonary:      Effort: Pulmonary effort is normal.      Breath sounds: Normal breath sounds.   Abdominal:      General: Abdomen is flat.   Skin:     General: Skin is warm.   Neurological:      General: No focal deficit present.      Mental Status: She is alert. Mental status is at baseline. She is disoriented.       Lab on 04/27/2023   Component Date Value Ref Range Status    WBC 04/27/2023 5.8  4.4 - 11.3 x10E9/L Final    nRBC 04/27/2023 0.0  0.0 - 0.0 /100 WBC Final    RBC 04/27/2023 4.57  4.00 - 5.20 x10E12/L Final    Hemoglobin 04/27/2023 13.2  12.0 - 16.0 g/dL Final    Hematocrit 04/27/2023 42.8  36.0 - 46.0 % Final    MCV 04/27/2023 94  80 - 100 fL Final    MCHC 04/27/2023 30.8 (L)  32.0 - 36.0 g/dL Final    Platelets 04/27/2023 334  150 - 450 x10E9/L Final    RDW 04/27/2023 14.7 (H)  11.5 - 14.5 % Final    Neutrophils % 04/27/2023 65.2  40.0 - 80.0 % Final    Immature Granulocytes %, Automated 04/27/2023 0.2  0.0 - 0.9 % Final     Immature Granulocyte Count (IG) includes promyelocytes,    myelocytes and metamyelocytes but does not include bands.   Percent differential counts (%) should be interpreted in the   context of the absolute cell counts (cells/L).    Lymphocytes % 04/27/2023 21.1  13.0 - 44.0 % Final    Monocytes % 04/27/2023 9.2  2.0 - 10.0 % Final    Eosinophils % 04/27/2023 3.1  0.0 - 6.0 % Final    Basophils % 04/27/2023 1.2  0.0 - 2.0 % Final    Neutrophils Absolute 04/27/2023 3.76  1.60 - 5.50 x10E9/L Final    Lymphocytes Absolute 04/27/2023 1.22  0.80 - 3.00 x10E9/L Final    Monocytes Absolute 04/27/2023 0.53  0.05 - 0.80 x10E9/L Final    Eosinophils Absolute 04/27/2023 0.18  0.00 - 0.40 x10E9/L Final    Basophils Absolute 04/27/2023 0.07  0.00 - 0.10 x10E9/L Final    Glucose 04/27/2023 101 (H)  74 - 99 mg/dL Final    Sodium 04/27/2023 143  136 - 145 mmol/L Final     Potassium 04/27/2023 4.5  3.5 - 5.3 mmol/L Final    Chloride 04/27/2023 109 (H)  98 - 107 mmol/L Final    Bicarbonate 04/27/2023 28  21 - 32 mmol/L Final    Anion Gap 04/27/2023 11  10 - 20 mmol/L Final    Urea Nitrogen 04/27/2023 12  6 - 23 mg/dL Final    Creatinine 04/27/2023 0.77  0.50 - 1.05 mg/dL Final    GFR Female 04/27/2023 76  >90 mL/min/1.73m2 Final     CALCULATIONS OF ESTIMATED GFR ARE PERFORMED   USING THE 2021 CKD-EPI STUDY REFIT EQUATION   WITHOUT THE RACE VARIABLE FOR THE IDMS-TRACEABLE   CREATININE METHODS.    https://jasn.asnjournals.org/content/early/2021/09/22/ASN.1035521507    Calcium 04/27/2023 9.6  8.6 - 10.6 mg/dL Final    Albumin 04/27/2023 4.2  3.4 - 5.0 g/dL Final    Alkaline Phosphatase 04/27/2023 96  33 - 136 U/L Final    Total Protein 04/27/2023 6.5  6.4 - 8.2 g/dL Final    AST 04/27/2023 19  9 - 39 U/L Final    Total Bilirubin 04/27/2023 0.6  0.0 - 1.2 mg/dL Final    ALT (SGPT) 04/27/2023 17  7 - 45 U/L Final     Patients treated with Sulfasalazine may generate    falsely decreased results for ALT.    Cholesterol 04/27/2023 167  0 - 199 mg/dL Final    .      AGE      DESIRABLE   BORDERLINE HIGH   HIGH     0-19 Y     0 - 169       170 - 199     >/= 200    20-24 Y     0 - 189       190 - 224     >/= 225         >24 Y     0 - 199       200 - 239     >/= 240   **All ranges are based on fasting samples. Specific   therapeutic targets will vary based on patient-specific   cardiac risk.  .   Pediatric guidelines reference:Pediatrics 2011, 128(S5).   Adult guidelines reference: NCEP ATPIII Guidelines,     MELINDA 2001, 258:2486-97  .   Venipuncture immediately after or during the    administration of Metamizole may lead to falsely   low results. Testing should be performed immediately   prior to Metamizole dosing.    HDL 04/27/2023 64.1  mg/dL Final    .      AGE      VERY LOW   LOW     NORMAL    HIGH       0-19 Y       < 35   < 40     40-45     ----    20-24 Y       ----   < 40       >45      ----      >24 Y       ----   < 40     40-60      >60  .    Cholesterol/HDL Ratio 04/27/2023 2.6   Final    REF VALUES  DESIRABLE  < 3.4  HIGH RISK  > 5.0    LDL 04/27/2023 83  0 - 99 mg/dL Final    .                           NEAR      BORD      AGE      DESIRABLE  OPTIMAL    HIGH     HIGH     VERY HIGH     0-19 Y     0 - 109     ---    110-129   >/= 130     ----    20-24 Y     0 - 119     ---    120-159   >/= 160     ----      >24 Y     0 -  99   100-129  130-159   160-189     >/=190  .    VLDL 04/27/2023 20  0 - 40 mg/dL Final    Triglycerides 04/27/2023 100  0 - 149 mg/dL Final    .      AGE      DESIRABLE   BORDERLINE HIGH   HIGH     VERY HIGH   0 D-90 D    19 - 174         ----         ----        ----  91 D- 9 Y     0 -  74        75 -  99     >/= 100      ----    10-19 Y     0 -  89        90 - 129     >/= 130      ----    20-24 Y     0 - 114       115 - 149     >/= 150      ----         >24 Y     0 - 149       150 - 199    200- 499    >/= 500  .   Venipuncture immediately after or during the    administration of Metamizole may lead to falsely   low results. Testing should be performed immediately   prior to Metamizole dosing.    TSH 04/27/2023 3.21  0.44 - 3.98 mIU/L Final     TSH testing is performed using different testing    methodology at [facility] than at other    [facility]. Direct result comparisons should    only be made within the same method.    Color, Urine 04/27/2023 YELLOW  STRAW,YELLOW Final    Appearance, Urine 04/27/2023 CLEAR  CLEAR Final    Specific Gravity, Urine 04/27/2023 1.012  1.005 - 1.035 Final    pH, Urine 04/27/2023 6.0  5.0 - 8.0 Final    Protein, Urine 04/27/2023 NEGATIVE  NEGATIVE mg/dL Final    Glucose, Urine 04/27/2023 NEGATIVE  NEGATIVE mg/dL Final    Blood, Urine 04/27/2023 NEGATIVE  NEGATIVE Final    Ketones, Urine 04/27/2023 NEGATIVE  NEGATIVE mg/dL Final    Bilirubin, Urine 04/27/2023 NEGATIVE  NEGATIVE Final    Urobilinogen, Urine 04/27/2023 <2.0   0.0 - 1.9 mg/dL Final    Nitrite, Urine 04/27/2023 NEGATIVE  NEGATIVE Final    Leukocyte Esterase, Urine 04/27/2023 TRACE (A)  NEGATIVE Final    Hemoglobin A1C 04/27/2023 6.0 (A)  % Final         Diagnosis of Diabetes-Adults   Non-Diabetic: < or = 5.6%   Increased risk for developing diabetes: 5.7-6.4%   Diagnostic of diabetes: > or = 6.5%  .       Monitoring of Diabetes                Age (y)     Therapeutic Goal (%)   Adults:          >18           <7.0   Pediatrics:    13-18           <7.5                   7-12           <8.0                   0- 6            7.5-8.5   American Diabetes Association. Diabetes Care 33(S1), Jan 2010.    Estimated Average Glucose 04/27/2023 126  MG/DL Final    Amylase 04/27/2023 40  29 - 103 U/L Final    Lipase 04/27/2023 22  9 - 82 U/L Final     Venipuncture immediately after or during the    administration of Metamizole may lead to falsely   low results. Testing should be performed immediately   prior to Metamizole dosing.    WBC, Urine 04/27/2023 2  0 - 5 /HPF Final    RBC, Urine 04/27/2023 <1  0 - 5 /HPF Final    Squamous Epithelial Cells, Urine 04/27/2023 1  /HPF Final    Mucus, Urine 04/27/2023 1+  /LPF Final   [facility] Encounter on 09/01/2022   Component Date Value Ref Range Status    Cholesterol 09/01/2022 177  0 - 199 mg/dL Final    Comment: .      AGE      DESIRABLE   BORDERLINE HIGH   HIGH     0-19 Y     0 - 169       170 - 199     >/= 200    20-24 Y     0 - 189       190 - 224     >/= 225         >24 Y     0 - 199       200 - 239     >/= 240   **All ranges are based on fasting samples. Specific   therapeutic targets will vary based on patient-specific   cardiac risk.  .   Pediatric guidelines reference:Pediatrics 2011, 128(S5).   Adult guidelines reference: NCEP ATPIII Guidelines,     MELINDA 2001, 258:2486-97  .   Venipuncture immediately after or during the    administration of Metamizole may lead to falsely   low results. Testing should be performed immediately   prior to  Metamizole dosing.      HDL 09/01/2022 62.5  mg/dL Final    Comment: .      AGE      VERY LOW   LOW     NORMAL    HIGH       0-19 Y       < 35   < 40     40-45     ----    20-24 Y       ----   < 40       >45     ----      >24 Y       ----   < 40     40-60      >60  .      Cholesterol/HDL Ratio 09/01/2022 2.8   Final    Comment: REF VALUES  DESIRABLE  < 3.4  HIGH RISK  > 5.0      LDL 09/01/2022 99  0 - 99 mg/dL Final    Comment: .                           NEAR      BORD      AGE      DESIRABLE  OPTIMAL    HIGH     HIGH     VERY HIGH     0-19 Y     0 - 109     ---    110-129   >/= 130     ----    20-24 Y     0 - 119     ---    120-159   >/= 160     ----      >24 Y     0 -  99   100-129  130-159   160-189     >/=190  .      VLDL 09/01/2022 16  0 - 40 mg/dL Final    Triglycerides 09/01/2022 80  0 - 149 mg/dL Final    Comment: .      AGE      DESIRABLE   BORDERLINE HIGH   HIGH     VERY HIGH   0 D-90 D    19 - 174         ----         ----        ----  91 D- 9 Y     0 -  74        75 -  99     >/= 100      ----    10-19 Y     0 -  89        90 - 129     >/= 130      ----    20-24 Y     0 - 114       115 - 149     >/= 150      ----         >24 Y     0 - 149       150 - 199    200- 499    >/= 500  .   Venipuncture immediately after or during the    administration of Metamizole may lead to falsely   low results. Testing should be performed immediately   prior to Metamizole dosing.      T4, Total 09/01/2022 6.7  4.5 - 11.1 ug/dL Final    T3, Total 09/01/2022 121  60 - 200 ng/dL Final    Glucose 09/01/2022 96  74 - 99 mg/dL Final    Sodium 09/01/2022 142  136 - 145 mmol/L Final    Potassium 09/01/2022 4.3  3.5 - 5.3 mmol/L Final    Chloride 09/01/2022 109 (H)  98 - 107 mmol/L Final    Bicarbonate 09/01/2022 26  21 - 32 mmol/L Final    Anion Gap 09/01/2022 11  10 - 20 mmol/L Final    Urea Nitrogen 09/01/2022 18  6 - 23 mg/dL Final    Creatinine 09/01/2022 0.78  0.50 - 1.05 mg/dL Final    GFR Female 09/01/2022 75  >90  mL/min/1.73m2 Final    Comment:  CALCULATIONS OF ESTIMATED GFR ARE PERFORMED   USING THE 2021 CKD-EPI STUDY REFIT EQUATION   WITHOUT THE RACE VARIABLE FOR THE IDMS-TRACEABLE   CREATININE METHODS.    https://jasn.asnjournals.org/content/early/2021/09/22/ASN.0343930128      Calcium 09/01/2022 9.4  8.6 - 10.6 mg/dL Final    Albumin 09/01/2022 4.3  3.4 - 5.0 g/dL Final    Alkaline Phosphatase 09/01/2022 110  33 - 136 U/L Final    Total Protein 09/01/2022 6.4  6.4 - 8.2 g/dL Final    AST 09/01/2022 16  9 - 39 U/L Final    Total Bilirubin 09/01/2022 0.4  0.0 - 1.2 mg/dL Final    ALT (SGPT) 09/01/2022 15  7 - 45 U/L Final    Comment:  Patients treated with Sulfasalazine may generate    falsely decreased results for ALT.      WBC 09/01/2022 5.1  4.4 - 11.3 x10E9/L Final    nRBC 09/01/2022 0.0  0.0 - 0.0 /100 WBC Final    RBC 09/01/2022 4.36  4.00 - 5.20 x10E12/L Final    Hemoglobin 09/01/2022 12.4  12.0 - 16.0 g/dL Final    Hematocrit 09/01/2022 40.4  36.0 - 46.0 % Final    MCV 09/01/2022 93  80 - 100 fL Final    MCHC 09/01/2022 30.7 (L)  32.0 - 36.0 g/dL Final    Platelets 09/01/2022 361  150 - 450 x10E9/L Final    RDW 09/01/2022 14.4  11.5 - 14.5 % Final    Neutrophils % 09/01/2022 62.6  40.0 - 80.0 % Final    Immature Granulocytes %, Automated 09/01/2022 0.2  0.0 - 0.9 % Final    Comment:  Immature Granulocyte Count (IG) includes promyelocytes,    myelocytes and metamyelocytes but does not include bands.   Percent differential counts (%) should be interpreted in the   context of the absolute cell counts (cells/L).      Lymphocytes % 09/01/2022 23.9  13.0 - 44.0 % Final    Monocytes % 09/01/2022 8.6  2.0 - 10.0 % Final    Eosinophils % 09/01/2022 3.3  0.0 - 6.0 % Final    Basophils % 09/01/2022 1.4  0.0 - 2.0 % Final    Neutrophils Absolute 09/01/2022 3.22  1.60 - 5.50 x10E9/L Final    Lymphocytes Absolute 09/01/2022 1.23  0.80 - 3.00 x10E9/L Final    Monocytes Absolute 09/01/2022 0.44  0.05 - 0.80 x10E9/L Final     Eosinophils Absolute 09/01/2022 0.17  0.00 - 0.40 x10E9/L Final    Basophils Absolute 09/01/2022 0.07  0.00 - 0.10 x10E9/L Final    TSH 09/01/2022 4.34 (H)  0.44 - 3.98 mIU/L Final    Comment:  TSH testing is performed using different testing    methodology at [facility] than at other    [facility]. Direct result comparisons should    only be made within the same method.       Current Outpatient Medications on File Prior to Visit   Medication Sig Dispense Refill    acetaminophen (Tylenol) 325 mg tablet Take 2 tablets (650 mg) by mouth every 6 hours if needed.      atorvastatin (Lipitor) 10 mg tablet Take 1 tablet (10 mg) by mouth once daily.      calcium carbonate (Tums) 200 mg calcium chewable tablet Tums 200 mg calcium (500 mg) chewable tablet   Take by oral route.      donepezil (Aricept) 10 mg tablet Take 1 tablet (10 mg) by mouth once daily.      famotidine (Pepcid) 20 mg tablet Take 1 tablet (20 mg) by mouth in the morning and 1 tablet (20 mg) before bedtime. 60 tablet 1    ketoconazole (NIZOral) 2 % shampoo use to wash affected area on chest every other day. lather  let sit for 3-5 minutes then rinse well      levothyroxine (Synthroid, Levoxyl) 25 mcg tablet Take 1 tablet (25 mcg) by mouth once daily.      loratadine (Claritin) 10 mg tablet Take 1 tablet (10 mg) by mouth once daily.      memantine (Namenda) 10 mg tablet Take by mouth twice a day.      multivitamin capsule Take 1 capsule by mouth once daily.      [DISCONTINUED] alendronate 70 mg tablet, effervescent alendronate 70 mg effervescent tablet   Take 1 tablet every week by oral route.      [DISCONTINUED] aspirin 81 mg EC tablet once daily.      [DISCONTINUED] benzonatate (Tessalon) 100 mg capsule every 8 hours.      [DISCONTINUED] calcium carbonate 600 mg calcium (1,500 mg) tablet Take 1 tablet (600 mg) by mouth.      [DISCONTINUED] calcium carbonate-vitamin D3 600 mg-5 mcg (200 unit) tablet Take by mouth.      [DISCONTINUED]  clotrimazole-betamethasone (Lotrisone) cream clotrimazole-betamethasone 1 %-0.05 % topical cream   APPLY TO THE AFFECTED AND SURROUNDING AREAS OF SKIN BY TOPICAL ROUTE 2 TIMES PER DAY IN THE MORNING AND EVENING FOR 2 WEEKS 45 g 0    [DISCONTINUED] guaiFENesin (Mucinex) 1,200 mg tablet extended release 12hr Take 1 tablet (1,200 mg) by mouth in the morning and 1 tablet (1,200 mg) in the evening.      [DISCONTINUED] omeprazole (PriLOSEC) 20 mg DR capsule Take 1 capsule (20 mg) by mouth once daily.      [DISCONTINUED] phenazopyridine (Pyridium) 200 mg tablet every 8 hours.       No current facility-administered medications on file prior to visit.     No images are attached to the encounter.            Assessment/Plan   Problem List Items Addressed This Visit       Gastroesophageal reflux disease without esophagitis     Going to continue on famotidine.  Upper GI shows gastric polyps.  You have seen GI specialist in consultation who do not recommend EGD.         Relevant Medications    famotidine (Pepcid) 20 mg tablet    Alzheimer's dementia with behavioral disturbance (CMS/HCC)    Dementia with behavioral disturbance (CMS/HCC)     As noted above         RESOLVED: Dementia (CMS/HCC) - Primary    Gastric polyps    Hypercholesterolemia    Gastroesophageal reflux disease

## 2023-06-20 NOTE — ASSESSMENT & PLAN NOTE
Going to continue on famotidine.  Upper GI shows gastric polyps.  You have seen GI specialist in consultation who do not recommend EGD.

## 2023-07-25 DIAGNOSIS — F02.C0 SEVERE LATE ONSET ALZHEIMER'S DEMENTIA, UNSPECIFIED WHETHER BEHAVIORAL, PSYCHOTIC, OR MOOD DISTURBANCE OR ANXIETY (MULTI): ICD-10-CM

## 2023-07-25 DIAGNOSIS — G30.1 SEVERE LATE ONSET ALZHEIMER'S DEMENTIA, UNSPECIFIED WHETHER BEHAVIORAL, PSYCHOTIC, OR MOOD DISTURBANCE OR ANXIETY (MULTI): ICD-10-CM

## 2023-07-25 DIAGNOSIS — E03.9 HYPOTHYROIDISM, UNSPECIFIED TYPE: Primary | ICD-10-CM

## 2023-07-25 RX ORDER — MEMANTINE HYDROCHLORIDE 10 MG/1
10 TABLET ORAL 2 TIMES DAILY
Qty: 180 TABLET | Refills: 3 | Status: SHIPPED | OUTPATIENT
Start: 2023-07-25 | End: 2023-09-21 | Stop reason: SDUPTHER

## 2023-07-25 RX ORDER — LEVOTHYROXINE SODIUM 25 UG/1
25 TABLET ORAL DAILY
Qty: 90 TABLET | Refills: 3 | Status: SHIPPED | OUTPATIENT
Start: 2023-07-25 | End: 2023-09-21 | Stop reason: SDUPTHER

## 2023-07-31 ENCOUNTER — OFFICE VISIT (OUTPATIENT)
Dept: PRIMARY CARE | Facility: CLINIC | Age: 83
End: 2023-07-31
Payer: MEDICARE

## 2023-07-31 VITALS
SYSTOLIC BLOOD PRESSURE: 122 MMHG | WEIGHT: 164 LBS | BODY MASS INDEX: 28 KG/M2 | HEART RATE: 64 BPM | TEMPERATURE: 94 F | HEIGHT: 64 IN | DIASTOLIC BLOOD PRESSURE: 79 MMHG

## 2023-07-31 DIAGNOSIS — K21.9 GASTROESOPHAGEAL REFLUX DISEASE WITHOUT ESOPHAGITIS: Primary | ICD-10-CM

## 2023-07-31 DIAGNOSIS — G30.9 ALZHEIMER'S DEMENTIA WITH BEHAVIORAL DISTURBANCE (MULTI): ICD-10-CM

## 2023-07-31 DIAGNOSIS — G30.9 ALZHEIMER'S DISEASE (MULTI): ICD-10-CM

## 2023-07-31 DIAGNOSIS — F02.818 ALZHEIMER'S DEMENTIA WITH BEHAVIORAL DISTURBANCE (MULTI): ICD-10-CM

## 2023-07-31 DIAGNOSIS — F03.918 DEMENTIA WITH BEHAVIORAL DISTURBANCE (MULTI): ICD-10-CM

## 2023-07-31 DIAGNOSIS — T67.1XXD HEAT SYNCOPE, SUBSEQUENT ENCOUNTER: ICD-10-CM

## 2023-07-31 DIAGNOSIS — F02.80 ALZHEIMER'S DISEASE (MULTI): ICD-10-CM

## 2023-07-31 PROBLEM — F03.90 DEMENTIA (MULTI): Status: ACTIVE | Noted: 2023-07-31

## 2023-07-31 PROBLEM — T67.1XXA HEAT SYNCOPE: Status: ACTIVE | Noted: 2023-07-31

## 2023-07-31 PROCEDURE — 1036F TOBACCO NON-USER: CPT | Performed by: FAMILY MEDICINE

## 2023-07-31 PROCEDURE — 1160F RVW MEDS BY RX/DR IN RCRD: CPT | Performed by: FAMILY MEDICINE

## 2023-07-31 PROCEDURE — 1126F AMNT PAIN NOTED NONE PRSNT: CPT | Performed by: FAMILY MEDICINE

## 2023-07-31 PROCEDURE — 99214 OFFICE O/P EST MOD 30 MIN: CPT | Performed by: FAMILY MEDICINE

## 2023-07-31 PROCEDURE — 1159F MED LIST DOCD IN RCRD: CPT | Performed by: FAMILY MEDICINE

## 2023-07-31 RX ORDER — CLOTRIMAZOLE AND BETAMETHASONE DIPROPIONATE 10; .64 MG/G; MG/G
CREAM TOPICAL
COMMUNITY
Start: 2022-12-27

## 2023-07-31 ASSESSMENT — ENCOUNTER SYMPTOMS
BACK PAIN: 0
POLYPHAGIA: 0
RHINORRHEA: 0
SORE THROAT: 0
FLANK PAIN: 0
NAUSEA: 0
CONFUSION: 1
EYE ITCHING: 0
EYE REDNESS: 0
APNEA: 0
DIARRHEA: 0
ABDOMINAL PAIN: 0
SLEEP DISTURBANCE: 0
CONSTIPATION: 0
PALPITATIONS: 0
CHILLS: 0
DYSPHORIC MOOD: 0
ACTIVITY CHANGE: 0
TREMORS: 0
ABDOMINAL DISTENTION: 0
CHOKING: 0
SHORTNESS OF BREATH: 0
WEAKNESS: 0

## 2023-07-31 ASSESSMENT — PATIENT HEALTH QUESTIONNAIRE - PHQ9
1. LITTLE INTEREST OR PLEASURE IN DOING THINGS: NOT AT ALL
SUM OF ALL RESPONSES TO PHQ9 QUESTIONS 1 AND 2: 0
2. FEELING DOWN, DEPRESSED OR HOPELESS: NOT AT ALL

## 2023-07-31 NOTE — PROGRESS NOTES
Subjective   Patient ID: Salina Malik is a 83 y.o. female who presents for Hospital Follow-up.    Activity back to normal some dizziness.  Patient presents after follow-up.  Patient presented to the emergency department after having syncopal episode.  According to  she got out of a very hot shower had a heater on in the bathroom and then she walked into a regular room temperature room.  She sat down became nauseous and then proceeded to pass out.    He called the paramedics and they came by the time they were there she was still groggy but awake and alert.  She did not lose control bowel or bladder continence.    No seizure activity noted.    Evaluated in the emergency room had lab testing performed proceed fluid hydration.  Patient was then discharged to home    Checking BP and O2.         Here for follow-up.  History of dementia.  Patient was at home with dizziness.  Getting up to get dressed felt lightheaded assisted back to bed without incident blood pressure was low.  Evaluated emergency department.  Patient had episode of emesis as well.  No complaints before syncopal episode.    No complaints last couple days.    Patient felt to have vasovagal syncope or dehydration.    Patient very poor historian.    Chest x-ray showed evidence of atelectasis no evidence of UTI.      Review of Systems   Constitutional:  Negative for activity change and chills.   HENT:  Negative for congestion, drooling, rhinorrhea and sore throat.    Eyes:  Negative for redness and itching.   Respiratory:  Negative for apnea, choking and shortness of breath.    Cardiovascular:  Negative for chest pain and palpitations.        Syncope   Gastrointestinal:  Negative for abdominal distention, abdominal pain, constipation, diarrhea and nausea.   Endocrine: Negative for heat intolerance and polyphagia.   Genitourinary:  Negative for dyspareunia, flank pain and genital sores.   Musculoskeletal:  Negative for back pain and gait problem.  "  Neurological:  Positive for syncope. Negative for tremors and weakness.   Psychiatric/Behavioral:  Positive for behavioral problems and confusion. Negative for dysphoric mood and sleep disturbance.        Objective   /79   Pulse 64   Temp 34.4 °C (94 °F)   Ht 1.626 m (5' 4\")   Wt 74.4 kg (164 lb)   BMI 28.15 kg/m²   BSA Body surface area is 1.83 meters squared.      Physical Exam  Constitutional:       Appearance: Normal appearance.   Cardiovascular:      Rate and Rhythm: Normal rate and regular rhythm.      Pulses: Normal pulses.      Heart sounds: Normal heart sounds.   Pulmonary:      Effort: Pulmonary effort is normal.      Breath sounds: Normal breath sounds.   Musculoskeletal:         General: Normal range of motion.      Cervical back: Normal range of motion and neck supple.   Neurological:      General: No focal deficit present.      Mental Status: She is alert. Mental status is at baseline. She is disoriented.      Cranial Nerves: No cranial nerve deficit.      Motor: No weakness.   Psychiatric:         Mood and Affect: Mood normal.         Thought Content: Thought content normal.     Finger-to-nose testing intact cranial nerves II through XII intact moving all extremities without difficulty  Lab on 04/27/2023   Component Date Value Ref Range Status    WBC 04/27/2023 5.8  4.4 - 11.3 x10E9/L Final    nRBC 04/27/2023 0.0  0.0 - 0.0 /100 WBC Final    RBC 04/27/2023 4.57  4.00 - 5.20 x10E12/L Final    Hemoglobin 04/27/2023 13.2  12.0 - 16.0 g/dL Final    Hematocrit 04/27/2023 42.8  36.0 - 46.0 % Final    MCV 04/27/2023 94  80 - 100 fL Final    MCHC 04/27/2023 30.8 (L)  32.0 - 36.0 g/dL Final    Platelets 04/27/2023 334  150 - 450 x10E9/L Final    RDW 04/27/2023 14.7 (H)  11.5 - 14.5 % Final    Neutrophils % 04/27/2023 65.2  40.0 - 80.0 % Final    Immature Granulocytes %, Automated 04/27/2023 0.2  0.0 - 0.9 % Final     Immature Granulocyte Count (IG) includes promyelocytes,    myelocytes and " metamyelocytes but does not include bands.   Percent differential counts (%) should be interpreted in the   context of the absolute cell counts (cells/L).    Lymphocytes % 04/27/2023 21.1  13.0 - 44.0 % Final    Monocytes % 04/27/2023 9.2  2.0 - 10.0 % Final    Eosinophils % 04/27/2023 3.1  0.0 - 6.0 % Final    Basophils % 04/27/2023 1.2  0.0 - 2.0 % Final    Neutrophils Absolute 04/27/2023 3.76  1.60 - 5.50 x10E9/L Final    Lymphocytes Absolute 04/27/2023 1.22  0.80 - 3.00 x10E9/L Final    Monocytes Absolute 04/27/2023 0.53  0.05 - 0.80 x10E9/L Final    Eosinophils Absolute 04/27/2023 0.18  0.00 - 0.40 x10E9/L Final    Basophils Absolute 04/27/2023 0.07  0.00 - 0.10 x10E9/L Final    Glucose 04/27/2023 101 (H)  74 - 99 mg/dL Final    Sodium 04/27/2023 143  136 - 145 mmol/L Final    Potassium 04/27/2023 4.5  3.5 - 5.3 mmol/L Final    Chloride 04/27/2023 109 (H)  98 - 107 mmol/L Final    Bicarbonate 04/27/2023 28  21 - 32 mmol/L Final    Anion Gap 04/27/2023 11  10 - 20 mmol/L Final    Urea Nitrogen 04/27/2023 12  6 - 23 mg/dL Final    Creatinine 04/27/2023 0.77  0.50 - 1.05 mg/dL Final    GFR Female 04/27/2023 76  >90 mL/min/1.73m2 Final     CALCULATIONS OF ESTIMATED GFR ARE PERFORMED   USING THE 2021 CKD-EPI STUDY REFIT EQUATION   WITHOUT THE RACE VARIABLE FOR THE IDMS-TRACEABLE   CREATININE METHODS.    https://jasn.asnjournals.org/content/early/2021/09/22/ASN.6481221397    Calcium 04/27/2023 9.6  8.6 - 10.6 mg/dL Final    Albumin 04/27/2023 4.2  3.4 - 5.0 g/dL Final    Alkaline Phosphatase 04/27/2023 96  33 - 136 U/L Final    Total Protein 04/27/2023 6.5  6.4 - 8.2 g/dL Final    AST 04/27/2023 19  9 - 39 U/L Final    Total Bilirubin 04/27/2023 0.6  0.0 - 1.2 mg/dL Final    ALT (SGPT) 04/27/2023 17  7 - 45 U/L Final     Patients treated with Sulfasalazine may generate    falsely decreased results for ALT.    Cholesterol 04/27/2023 167  0 - 199 mg/dL Final    .      AGE      DESIRABLE   BORDERLINE HIGH   HIGH      0-19 Y     0 - 169       170 - 199     >/= 200    20-24 Y     0 - 189       190 - 224     >/= 225         >24 Y     0 - 199       200 - 239     >/= 240   **All ranges are based on fasting samples. Specific   therapeutic targets will vary based on patient-specific   cardiac risk.  .   Pediatric guidelines reference:Pediatrics 2011, 128(S5).   Adult guidelines reference: NCEP ATPIII Guidelines,     MELINDA 2001, 258:5426-97  .   Venipuncture immediately after or during the    administration of Metamizole may lead to falsely   low results. Testing should be performed immediately   prior to Metamizole dosing.    HDL 04/27/2023 64.1  mg/dL Final    .      AGE      VERY LOW   LOW     NORMAL    HIGH       0-19 Y       < 35   < 40     40-45     ----    20-24 Y       ----   < 40       >45     ----      >24 Y       ----   < 40     40-60      >60  .    Cholesterol/HDL Ratio 04/27/2023 2.6   Final    REF VALUES  DESIRABLE  < 3.4  HIGH RISK  > 5.0    LDL 04/27/2023 83  0 - 99 mg/dL Final    .                           NEAR      BORD      AGE      DESIRABLE  OPTIMAL    HIGH     HIGH     VERY HIGH     0-19 Y     0 - 109     ---    110-129   >/= 130     ----    20-24 Y     0 - 119     ---    120-159   >/= 160     ----      >24 Y     0 -  99   100-129  130-159   160-189     >/=190  .    VLDL 04/27/2023 20  0 - 40 mg/dL Final    Triglycerides 04/27/2023 100  0 - 149 mg/dL Final    .      AGE      DESIRABLE   BORDERLINE HIGH   HIGH     VERY HIGH   0 D-90 D    19 - 174         ----         ----        ----  91 D- 9 Y     0 -  74        75 -  99     >/= 100      ----    10-19 Y     0 -  89        90 - 129     >/= 130      ----    20-24 Y     0 - 114       115 - 149     >/= 150      ----         >24 Y     0 - 149       150 - 199    200- 499    >/= 500  .   Venipuncture immediately after or during the    administration of Metamizole may lead to falsely   low results. Testing should be performed immediately   prior to Metamizole dosing.     TSH 04/27/2023 3.21  0.44 - 3.98 mIU/L Final     TSH testing is performed using different testing    methodology at [facility] than at other    [facility]. Direct result comparisons should    only be made within the same method.    Color, Urine 04/27/2023 YELLOW  STRAW,YELLOW Final    Appearance, Urine 04/27/2023 CLEAR  CLEAR Final    Specific Gravity, Urine 04/27/2023 1.012  1.005 - 1.035 Final    pH, Urine 04/27/2023 6.0  5.0 - 8.0 Final    Protein, Urine 04/27/2023 NEGATIVE  NEGATIVE mg/dL Final    Glucose, Urine 04/27/2023 NEGATIVE  NEGATIVE mg/dL Final    Blood, Urine 04/27/2023 NEGATIVE  NEGATIVE Final    Ketones, Urine 04/27/2023 NEGATIVE  NEGATIVE mg/dL Final    Bilirubin, Urine 04/27/2023 NEGATIVE  NEGATIVE Final    Urobilinogen, Urine 04/27/2023 <2.0  0.0 - 1.9 mg/dL Final    Nitrite, Urine 04/27/2023 NEGATIVE  NEGATIVE Final    Leukocyte Esterase, Urine 04/27/2023 TRACE (A)  NEGATIVE Final    Hemoglobin A1C 04/27/2023 6.0 (A)  % Final         Diagnosis of Diabetes-Adults   Non-Diabetic: < or = 5.6%   Increased risk for developing diabetes: 5.7-6.4%   Diagnostic of diabetes: > or = 6.5%  .       Monitoring of Diabetes                Age (y)     Therapeutic Goal (%)   Adults:          >18           <7.0   Pediatrics:    13-18           <7.5                   7-12           <8.0                   0- 6            7.5-8.5   American Diabetes Association. Diabetes Care 33(S1), Jan 2010.    Estimated Average Glucose 04/27/2023 126  MG/DL Final    Amylase 04/27/2023 40  29 - 103 U/L Final    Lipase 04/27/2023 22  9 - 82 U/L Final     Venipuncture immediately after or during the    administration of Metamizole may lead to falsely   low results. Testing should be performed immediately   prior to Metamizole dosing.    WBC, Urine 04/27/2023 2  0 - 5 /HPF Final    RBC, Urine 04/27/2023 <1  0 - 5 /HPF Final    Squamous Epithelial Cells, Urine 04/27/2023 1  /HPF Final    Mucus, Urine 04/27/2023 1+   /LPF Final   [facility] Encounter on 09/01/2022   Component Date Value Ref Range Status    Cholesterol 09/01/2022 177  0 - 199 mg/dL Final    Comment: .      AGE      DESIRABLE   BORDERLINE HIGH   HIGH     0-19 Y     0 - 169       170 - 199     >/= 200    20-24 Y     0 - 189       190 - 224     >/= 225         >24 Y     0 - 199       200 - 239     >/= 240   **All ranges are based on fasting samples. Specific   therapeutic targets will vary based on patient-specific   cardiac risk.  .   Pediatric guidelines reference:Pediatrics 2011, 128(S5).   Adult guidelines reference: NCEP ATPIII Guidelines,     MELINDA 2001, 258:2276-97  .   Venipuncture immediately after or during the    administration of Metamizole may lead to falsely   low results. Testing should be performed immediately   prior to Metamizole dosing.      HDL 09/01/2022 62.5  mg/dL Final    Comment: .      AGE      VERY LOW   LOW     NORMAL    HIGH       0-19 Y       < 35   < 40     40-45     ----    20-24 Y       ----   < 40       >45     ----      >24 Y       ----   < 40     40-60      >60  .      Cholesterol/HDL Ratio 09/01/2022 2.8   Final    Comment: REF VALUES  DESIRABLE  < 3.4  HIGH RISK  > 5.0      LDL 09/01/2022 99  0 - 99 mg/dL Final    Comment: .                           NEAR      BORD      AGE      DESIRABLE  OPTIMAL    HIGH     HIGH     VERY HIGH     0-19 Y     0 - 109     ---    110-129   >/= 130     ----    20-24 Y     0 - 119     ---    120-159   >/= 160     ----      >24 Y     0 -  99   100-129  130-159   160-189     >/=190  .      VLDL 09/01/2022 16  0 - 40 mg/dL Final    Triglycerides 09/01/2022 80  0 - 149 mg/dL Final    Comment: .      AGE      DESIRABLE   BORDERLINE HIGH   HIGH     VERY HIGH   0 D-90 D    19 - 174         ----         ----        ----  91 D- 9 Y     0 -  74        75 -  99     >/= 100      ----    10-19 Y     0 -  89        90 - 129     >/= 130      ----    20-24 Y     0 - 114       115 - 149     >/= 150      ----         >24  Y     0 - 149       150 - 199    200- 499    >/= 500  .   Venipuncture immediately after or during the    administration of Metamizole may lead to falsely   low results. Testing should be performed immediately   prior to Metamizole dosing.      T4, Total 09/01/2022 6.7  4.5 - 11.1 ug/dL Final    T3, Total 09/01/2022 121  60 - 200 ng/dL Final    Glucose 09/01/2022 96  74 - 99 mg/dL Final    Sodium 09/01/2022 142  136 - 145 mmol/L Final    Potassium 09/01/2022 4.3  3.5 - 5.3 mmol/L Final    Chloride 09/01/2022 109 (H)  98 - 107 mmol/L Final    Bicarbonate 09/01/2022 26  21 - 32 mmol/L Final    Anion Gap 09/01/2022 11  10 - 20 mmol/L Final    Urea Nitrogen 09/01/2022 18  6 - 23 mg/dL Final    Creatinine 09/01/2022 0.78  0.50 - 1.05 mg/dL Final    GFR Female 09/01/2022 75  >90 mL/min/1.73m2 Final    Comment:  CALCULATIONS OF ESTIMATED GFR ARE PERFORMED   USING THE 2021 CKD-EPI STUDY REFIT EQUATION   WITHOUT THE RACE VARIABLE FOR THE IDMS-TRACEABLE   CREATININE METHODS.    https://jasn.asnjournals.org/content/early/2021/09/22/ASN.8683888775      Calcium 09/01/2022 9.4  8.6 - 10.6 mg/dL Final    Albumin 09/01/2022 4.3  3.4 - 5.0 g/dL Final    Alkaline Phosphatase 09/01/2022 110  33 - 136 U/L Final    Total Protein 09/01/2022 6.4  6.4 - 8.2 g/dL Final    AST 09/01/2022 16  9 - 39 U/L Final    Total Bilirubin 09/01/2022 0.4  0.0 - 1.2 mg/dL Final    ALT (SGPT) 09/01/2022 15  7 - 45 U/L Final    Comment:  Patients treated with Sulfasalazine may generate    falsely decreased results for ALT.      WBC 09/01/2022 5.1  4.4 - 11.3 x10E9/L Final    nRBC 09/01/2022 0.0  0.0 - 0.0 /100 WBC Final    RBC 09/01/2022 4.36  4.00 - 5.20 x10E12/L Final    Hemoglobin 09/01/2022 12.4  12.0 - 16.0 g/dL Final    Hematocrit 09/01/2022 40.4  36.0 - 46.0 % Final    MCV 09/01/2022 93  80 - 100 fL Final    MCHC 09/01/2022 30.7 (L)  32.0 - 36.0 g/dL Final    Platelets 09/01/2022 361  150 - 450 x10E9/L Final    RDW 09/01/2022 14.4  11.5 - 14.5 %  Final    Neutrophils % 09/01/2022 62.6  40.0 - 80.0 % Final    Immature Granulocytes %, Automated 09/01/2022 0.2  0.0 - 0.9 % Final    Comment:  Immature Granulocyte Count (IG) includes promyelocytes,    myelocytes and metamyelocytes but does not include bands.   Percent differential counts (%) should be interpreted in the   context of the absolute cell counts (cells/L).      Lymphocytes % 09/01/2022 23.9  13.0 - 44.0 % Final    Monocytes % 09/01/2022 8.6  2.0 - 10.0 % Final    Eosinophils % 09/01/2022 3.3  0.0 - 6.0 % Final    Basophils % 09/01/2022 1.4  0.0 - 2.0 % Final    Neutrophils Absolute 09/01/2022 3.22  1.60 - 5.50 x10E9/L Final    Lymphocytes Absolute 09/01/2022 1.23  0.80 - 3.00 x10E9/L Final    Monocytes Absolute 09/01/2022 0.44  0.05 - 0.80 x10E9/L Final    Eosinophils Absolute 09/01/2022 0.17  0.00 - 0.40 x10E9/L Final    Basophils Absolute 09/01/2022 0.07  0.00 - 0.10 x10E9/L Final    TSH 09/01/2022 4.34 (H)  0.44 - 3.98 mIU/L Final    Comment:  TSH testing is performed using different testing    methodology at [facility] than at other    [facility]. Direct result comparisons should    only be made within the same method.       Current Outpatient Medications on File Prior to Visit   Medication Sig Dispense Refill    acetaminophen (Tylenol) 325 mg tablet Take 2 tablets (650 mg) by mouth every 6 hours if needed.      atorvastatin (Lipitor) 10 mg tablet Take 1 tablet (10 mg) by mouth once daily.      calcium carbonate (Tums) 200 mg calcium chewable tablet Tums 200 mg calcium (500 mg) chewable tablet   Take by oral route.      clotrimazole-betamethasone (Lotrisone) cream APPLY TO THE AFFECTED AND SURROUNDING AREAS OF SKIN BY TOPICAL ROUTE 2 TIMES PER DAY IN THE MORNING AND EVENING FOR 2 WEEKS      donepezil (Aricept) 10 mg tablet Take 1 tablet (10 mg) by mouth once daily.      famotidine (Pepcid) 20 mg tablet Take 1 tablet (20 mg) by mouth 2 times a day. 180 tablet 1    ketoconazole  (NIZOral) 2 % shampoo use to wash affected area on chest every other day. lather  let sit for 3-5 minutes then rinse well      levothyroxine (Synthroid, Levoxyl) 25 mcg tablet TAKE 1 TABLET BY MOUTH  DAILY 90 tablet 3    loratadine (Claritin) 10 mg tablet Take 1 tablet (10 mg) by mouth once daily.      memantine (Namenda) 10 mg tablet TAKE 1 TABLET BY MOUTH  TWICE DAILY 180 tablet 3    multivitamin capsule Take 1 capsule by mouth once daily.      [DISCONTINUED] levothyroxine (Synthroid, Levoxyl) 25 mcg tablet Take 1 tablet (25 mcg) by mouth once daily.      [DISCONTINUED] memantine (Namenda) 10 mg tablet Take by mouth twice a day.       No current facility-administered medications on file prior to visit.     No images are attached to the encounter.            Assessment/Plan   Problem List Items Addressed This Visit       Gastroesophageal reflux disease without esophagitis - Primary    Alzheimer's dementia with behavioral disturbance (CMS/HCC)    Dementia with behavioral disturbance (CMS/HCC)    Alzheimer's disease (CMS/HCC)    Heat syncope

## 2023-07-31 NOTE — PATIENT INSTRUCTIONS
Lab studies have been reviewed.    Reviewed all labs with you and  today.  Offered further extensive evaluation for syncope.    Try and encourage water intake.    Please avoid the hot showers and hot room coming out into a cooler environment.    That we have discussed today can order other testing including echocardiogram and monitor.  You have refused at this time but stated in the future if this should recur we will do so.    Please encourage fluid intake 36 ounces of fluid daily.

## 2023-09-11 NOTE — TELEPHONE ENCOUNTER
There is no lab order in system.   Can you please put one in for the pt. She went to lab this morning and is here now

## 2023-09-21 ENCOUNTER — OFFICE VISIT (OUTPATIENT)
Dept: PRIMARY CARE | Facility: CLINIC | Age: 83
End: 2023-09-21
Payer: MEDICARE

## 2023-09-21 VITALS
BODY MASS INDEX: 29.41 KG/M2 | OXYGEN SATURATION: 96 % | TEMPERATURE: 97.3 F | DIASTOLIC BLOOD PRESSURE: 80 MMHG | HEART RATE: 80 BPM | HEIGHT: 63 IN | WEIGHT: 166 LBS | SYSTOLIC BLOOD PRESSURE: 118 MMHG

## 2023-09-21 DIAGNOSIS — Z12.31 BREAST CANCER SCREENING BY MAMMOGRAM: ICD-10-CM

## 2023-09-21 DIAGNOSIS — Z71.89 CARDIAC RISK COUNSELING: ICD-10-CM

## 2023-09-21 DIAGNOSIS — F02.C0 SEVERE LATE ONSET ALZHEIMER'S DEMENTIA, UNSPECIFIED WHETHER BEHAVIORAL, PSYCHOTIC, OR MOOD DISTURBANCE OR ANXIETY (MULTI): ICD-10-CM

## 2023-09-21 DIAGNOSIS — Z71.89 ADVANCE DIRECTIVE DISCUSSED WITH PATIENT: ICD-10-CM

## 2023-09-21 DIAGNOSIS — Z23 NEED FOR INFLUENZA VACCINATION: ICD-10-CM

## 2023-09-21 DIAGNOSIS — Z13.89 ENCOUNTER FOR SCREENING FOR OTHER DISORDER: ICD-10-CM

## 2023-09-21 DIAGNOSIS — E78.00 HYPERCHOLESTEROLEMIA: ICD-10-CM

## 2023-09-21 DIAGNOSIS — K21.9 GASTROESOPHAGEAL REFLUX DISEASE WITHOUT ESOPHAGITIS: ICD-10-CM

## 2023-09-21 DIAGNOSIS — F02.818 ALZHEIMER'S DEMENTIA WITH BEHAVIORAL DISTURBANCE (MULTI): ICD-10-CM

## 2023-09-21 DIAGNOSIS — E03.9 HYPOTHYROIDISM, UNSPECIFIED TYPE: ICD-10-CM

## 2023-09-21 DIAGNOSIS — G30.9 ALZHEIMER'S DEMENTIA WITH BEHAVIORAL DISTURBANCE (MULTI): ICD-10-CM

## 2023-09-21 DIAGNOSIS — M85.88 OSTEOPENIA OF LUMBAR SPINE: ICD-10-CM

## 2023-09-21 DIAGNOSIS — Z00.00 ENCOUNTER FOR ANNUAL WELLNESS VISIT (AWV) IN MEDICARE PATIENT: Primary | ICD-10-CM

## 2023-09-21 DIAGNOSIS — K21.00 GASTROESOPHAGEAL REFLUX DISEASE WITH ESOPHAGITIS, UNSPECIFIED WHETHER HEMORRHAGE: ICD-10-CM

## 2023-09-21 DIAGNOSIS — R35.0 URINARY FREQUENCY: ICD-10-CM

## 2023-09-21 DIAGNOSIS — E78.5 HYPERLIPIDEMIA, UNSPECIFIED HYPERLIPIDEMIA TYPE: ICD-10-CM

## 2023-09-21 DIAGNOSIS — Z00.00 ROUTINE GENERAL MEDICAL EXAMINATION AT HEALTH CARE FACILITY: ICD-10-CM

## 2023-09-21 DIAGNOSIS — G30.1 SEVERE LATE ONSET ALZHEIMER'S DEMENTIA, UNSPECIFIED WHETHER BEHAVIORAL, PSYCHOTIC, OR MOOD DISTURBANCE OR ANXIETY (MULTI): ICD-10-CM

## 2023-09-21 PROBLEM — K59.00 CONSTIPATION: Status: RESOLVED | Noted: 2018-11-15 | Resolved: 2023-09-21

## 2023-09-21 PROBLEM — F03.918 DEMENTIA WITH BEHAVIORAL DISTURBANCE (MULTI): Status: RESOLVED | Noted: 2020-11-05 | Resolved: 2023-09-21

## 2023-09-21 PROBLEM — J18.9 PNEUMONIA: Status: RESOLVED | Noted: 2022-12-02 | Resolved: 2023-09-21

## 2023-09-21 PROBLEM — Z96.642 HISTORY OF LEFT HIP HEMIARTHROPLASTY: Status: RESOLVED | Noted: 2020-06-03 | Resolved: 2023-09-21

## 2023-09-21 PROBLEM — R41.3 MEMORY LOSS: Status: RESOLVED | Noted: 2023-04-25 | Resolved: 2023-09-21

## 2023-09-21 PROBLEM — L60.0 INGROWING NAIL: Status: RESOLVED | Noted: 2023-04-25 | Resolved: 2023-09-21

## 2023-09-21 PROBLEM — R42 DIZZINESS: Status: RESOLVED | Noted: 2019-04-26 | Resolved: 2023-09-21

## 2023-09-21 PROBLEM — N30.00 ACUTE CYSTITIS: Status: RESOLVED | Noted: 2023-04-25 | Resolved: 2023-09-21

## 2023-09-21 PROBLEM — N39.0 UTI (URINARY TRACT INFECTION): Status: RESOLVED | Noted: 2023-04-25 | Resolved: 2023-09-21

## 2023-09-21 PROBLEM — D17.1 LIPOMA OF TORSO: Status: RESOLVED | Noted: 2018-06-21 | Resolved: 2023-09-21

## 2023-09-21 PROBLEM — B37.2 CANDIDAL DERMATITIS: Status: RESOLVED | Noted: 2023-04-25 | Resolved: 2023-09-21

## 2023-09-21 PROBLEM — N63.20 LEFT BREAST LUMP: Status: RESOLVED | Noted: 2023-04-25 | Resolved: 2023-09-21

## 2023-09-21 PROBLEM — K31.7 GASTRIC POLYPS: Status: RESOLVED | Noted: 2023-04-25 | Resolved: 2023-09-21

## 2023-09-21 PROBLEM — F02.80 ALZHEIMER'S DISEASE (MULTI): Status: RESOLVED | Noted: 2020-11-05 | Resolved: 2023-09-21

## 2023-09-21 PROBLEM — M10.9 ACUTE GOUT OF LEFT WRIST: Status: RESOLVED | Noted: 2023-04-25 | Resolved: 2023-09-21

## 2023-09-21 PROCEDURE — G0446 INTENS BEHAVE THER CARDIO DX: HCPCS | Performed by: FAMILY MEDICINE

## 2023-09-21 PROCEDURE — 99397 PER PM REEVAL EST PAT 65+ YR: CPT | Performed by: FAMILY MEDICINE

## 2023-09-21 PROCEDURE — 1159F MED LIST DOCD IN RCRD: CPT | Performed by: FAMILY MEDICINE

## 2023-09-21 PROCEDURE — 1036F TOBACCO NON-USER: CPT | Performed by: FAMILY MEDICINE

## 2023-09-21 PROCEDURE — 1160F RVW MEDS BY RX/DR IN RCRD: CPT | Performed by: FAMILY MEDICINE

## 2023-09-21 PROCEDURE — 99214 OFFICE O/P EST MOD 30 MIN: CPT | Performed by: FAMILY MEDICINE

## 2023-09-21 PROCEDURE — G0439 PPPS, SUBSEQ VISIT: HCPCS | Performed by: FAMILY MEDICINE

## 2023-09-21 PROCEDURE — 1126F AMNT PAIN NOTED NONE PRSNT: CPT | Performed by: FAMILY MEDICINE

## 2023-09-21 PROCEDURE — 99497 ADVNCD CARE PLAN 30 MIN: CPT | Performed by: FAMILY MEDICINE

## 2023-09-21 PROCEDURE — G0444 DEPRESSION SCREEN ANNUAL: HCPCS | Performed by: FAMILY MEDICINE

## 2023-09-21 PROCEDURE — G0008 ADMIN INFLUENZA VIRUS VAC: HCPCS | Performed by: FAMILY MEDICINE

## 2023-09-21 PROCEDURE — 90662 IIV NO PRSV INCREASED AG IM: CPT | Performed by: FAMILY MEDICINE

## 2023-09-21 PROCEDURE — 1170F FXNL STATUS ASSESSED: CPT | Performed by: FAMILY MEDICINE

## 2023-09-21 RX ORDER — DONEPEZIL HYDROCHLORIDE 10 MG/1
10 TABLET, FILM COATED ORAL DAILY
Qty: 90 TABLET | Refills: 3 | Status: SHIPPED | OUTPATIENT
Start: 2023-09-21

## 2023-09-21 RX ORDER — LEVOTHYROXINE SODIUM 25 UG/1
25 TABLET ORAL DAILY
Qty: 90 TABLET | Refills: 3 | Status: SHIPPED | OUTPATIENT
Start: 2023-09-21

## 2023-09-21 RX ORDER — ATORVASTATIN CALCIUM 10 MG/1
10 TABLET, FILM COATED ORAL DAILY
Qty: 90 TABLET | Refills: 3 | Status: SHIPPED | OUTPATIENT
Start: 2023-09-21

## 2023-09-21 RX ORDER — MEMANTINE HYDROCHLORIDE 10 MG/1
10 TABLET ORAL 2 TIMES DAILY
Qty: 180 TABLET | Refills: 3 | Status: SHIPPED | OUTPATIENT
Start: 2023-09-21

## 2023-09-21 RX ORDER — FAMOTIDINE 20 MG/1
20 TABLET, FILM COATED ORAL 2 TIMES DAILY
Qty: 180 TABLET | Refills: 3 | Status: SHIPPED | OUTPATIENT
Start: 2023-09-21 | End: 2024-09-15

## 2023-09-21 ASSESSMENT — ENCOUNTER SYMPTOMS
JOINT SWELLING: 0
DIZZINESS: 0
APPETITE CHANGE: 0
DIARRHEA: 0
LOSS OF SENSATION IN FEET: 0
SORE THROAT: 0
CHEST TIGHTNESS: 0
ABDOMINAL DISTENTION: 0
RESPIRATORY NEGATIVE: 1
EYE ITCHING: 0
NEUROLOGICAL NEGATIVE: 1
ABDOMINAL PAIN: 0
BACK PAIN: 0
CHILLS: 0
SINUS PAIN: 0
FEVER: 0
ADENOPATHY: 0
RHINORRHEA: 0
FACIAL SWELLING: 0
FATIGUE: 0
ARTHRALGIAS: 0
POLYDIPSIA: 0
NUMBNESS: 0
MYALGIAS: 0
APNEA: 0
HALLUCINATIONS: 0
TREMORS: 0
COLOR CHANGE: 0
DIAPHORESIS: 0
WEAKNESS: 0
NECK PAIN: 0
SLEEP DISTURBANCE: 0
NERVOUS/ANXIOUS: 0
ACTIVITY CHANGE: 0
HEADACHES: 0
EYE DISCHARGE: 0
BRUISES/BLEEDS EASILY: 0
CONFUSION: 0
CONSTIPATION: 0
SINUS PRESSURE: 0
PHOTOPHOBIA: 0
CARDIOVASCULAR NEGATIVE: 1
FREQUENCY: 0
OCCASIONAL FEELINGS OF UNSTEADINESS: 1
EYE PAIN: 0
COUGH: 0
VOMITING: 0
AGITATION: 0
SEIZURES: 0
DECREASED CONCENTRATION: 1
NAUSEA: 0
DIFFICULTY URINATING: 0
TROUBLE SWALLOWING: 0
EYE REDNESS: 0
LIGHT-HEADEDNESS: 0
BLOOD IN STOOL: 0
FACIAL ASYMMETRY: 0
DEPRESSION: 1

## 2023-09-21 ASSESSMENT — ACTIVITIES OF DAILY LIVING (ADL)
DOING_HOUSEWORK: NEEDS ASSISTANCE
BATHING: NEEDS ASSISTANCE
DRESSING: NEEDS ASSISTANCE
TAKING_MEDICATION: NEEDS ASSISTANCE
MANAGING_FINANCES: NEEDS ASSISTANCE
GROCERY_SHOPPING: NEEDS ASSISTANCE

## 2023-09-21 ASSESSMENT — PATIENT HEALTH QUESTIONNAIRE - PHQ9
SUM OF ALL RESPONSES TO PHQ9 QUESTIONS 1 AND 2: 1
10. IF YOU CHECKED OFF ANY PROBLEMS, HOW DIFFICULT HAVE THESE PROBLEMS MADE IT FOR YOU TO DO YOUR WORK, TAKE CARE OF THINGS AT HOME, OR GET ALONG WITH OTHER PEOPLE: SOMEWHAT DIFFICULT
2. FEELING DOWN, DEPRESSED OR HOPELESS: SEVERAL DAYS
1. LITTLE INTEREST OR PLEASURE IN DOING THINGS: NOT AT ALL

## 2023-09-21 NOTE — PATIENT INSTRUCTIONS
Because of episode of feeling somewhat lightheaded I have recommended doing a monitor but you have refused to do it at this time.  We will continue to monitor for symptoms but this should recur please let me know.    Please continue to follow-up with neurology Dr. Roger regarding dementia.    Medications reviewed and reconciled.  I did order mammogram ordered flu shot to be done here.  Please do the RSV and COVID vaccination at the pharmacy as we have discussed.    Reviewed labs with you today reviewed the ER report with you today.  I have recommended doing the monitor and further evaluation because a certainly would not want Salina to fall a cause hip fracture or other trouble.

## 2023-09-21 NOTE — ASSESSMENT & PLAN NOTE
Cholesterol levels are at goal reviewed recent cholesterol level with you.  Please continue atorvastatin

## 2023-09-21 NOTE — PROGRESS NOTES
Advance Care Planning Note     Discussion Date: 09/21/23   Discussion Participants: patient and spouse    The patient wishes to discuss Advance Care Planning today and the following is a brief summary of our discussion.     Patient has capacity to make their own medical decisions: No  Health Care Agent/Surrogate Decision Maker documented in chart: No    Documents on file and valid:  Advance Directive/Living Will: Yes   Health Care Power of : Yes  Other: no DNR.  Full code.  Will bring in a copy    Communication of Medical Status/Prognosis:   good     Communication of Treatment Goals/Options:   good     Treatment Decisions  good    Time Statement: Total face to face time spent on advance care planning was 16 minutes with 16 minutes spent in counseling, including the explanation.    Cirilo Thomason, [employer]   Patient ID: Salina Malik is a 83 y.o. female who presents for Medicare Annual Wellness Visit Subsequent.    Patient presents for follow-up.    Patient had episode once again of a little lightheadedness in the morning with getting up out of bed.    Patient had work-up for an episode of near syncope back in July.  I have recommended doing a monitor but you have refused.    She has had no further episodes after that.  Patient was given a little orange juice and felt fine.    There is been no troubles with headache that have been noted no troubles with chest pain or shortness of breath had been noted patient with severe dementia very difficult to communicate any ideas.    There is been no nausea no vomiting.  No troubles with swelling of the legs or feet.    No fever no chills.  No troubles with abdominal pain or discomfort.  This happened several weeks ago.  She has been fine since then.       Alcohol intake: none  Caffeine intake: decaf coffee  Exercise: walk regularly    Last Colonoscopy: N/A  Last Pap smear: N/A  Mammogram:5/2022  Last Dexa scan:5/2020    Shingles vaccine: done  TdaP vaccine:  "    Review of Systems   Constitutional:  Negative for activity change, appetite change, chills, diaphoresis, fatigue and fever.   HENT:  Positive for postnasal drip. Negative for congestion, dental problem, drooling, ear discharge, facial swelling, nosebleeds, rhinorrhea, sinus pressure, sinus pain, sneezing, sore throat, tinnitus and trouble swallowing.    Eyes:  Negative for photophobia, pain, discharge, redness, itching and visual disturbance.   Respiratory: Negative.  Negative for apnea, cough and chest tightness.    Cardiovascular: Negative.    Gastrointestinal:  Negative for abdominal distention, abdominal pain, blood in stool, constipation, diarrhea, nausea and vomiting.   Endocrine: Negative for cold intolerance, heat intolerance, polydipsia and polyuria.   Genitourinary:  Negative for difficulty urinating, enuresis and frequency.   Musculoskeletal:  Negative for arthralgias, back pain, joint swelling, myalgias and neck pain.   Skin:  Negative for color change and rash.   Allergic/Immunologic: Negative for environmental allergies and food allergies.   Neurological: Negative.  Negative for dizziness, tremors, seizures, syncope, facial asymmetry, weakness, light-headedness, numbness and headaches.   Hematological:  Negative for adenopathy. Does not bruise/bleed easily.   Psychiatric/Behavioral:  Positive for decreased concentration. Negative for agitation, confusion, hallucinations, self-injury, sleep disturbance and suicidal ideas. The patient is not nervous/anxious.         Dementia.    Seeing Zak.       Objective   /80   Pulse 80   Temp 36.3 °C (97.3 °F)   Ht 1.6 m (5' 3\")   Wt 75.3 kg (166 lb)   SpO2 96%   BMI 29.41 kg/m²   BSA Body surface area is 1.83 meters squared.      Physical Exam  Constitutional:       General: She is not in acute distress.     Appearance: Normal appearance. She is obese. She is not ill-appearing or diaphoretic.   HENT:      Head: Normocephalic.      Right Ear: " Tympanic membrane, ear canal and external ear normal. There is no impacted cerumen.      Left Ear: Tympanic membrane, ear canal and external ear normal. There is no impacted cerumen.      Nose: No congestion or rhinorrhea.      Mouth/Throat:      Mouth: Mucous membranes are moist.      Pharynx: No oropharyngeal exudate or posterior oropharyngeal erythema.   Eyes:      Conjunctiva/sclera: Conjunctivae normal.      Pupils: Pupils are equal, round, and reactive to light.   Neck:      Vascular: No carotid bruit.   Cardiovascular:      Rate and Rhythm: Normal rate.      Pulses: Normal pulses.      Heart sounds: No murmur heard.     No friction rub.   Pulmonary:      Effort: No respiratory distress.      Breath sounds: No stridor.   Abdominal:      General: Abdomen is flat. There is no distension.      Palpations: Abdomen is soft.      Tenderness: There is no abdominal tenderness. There is no guarding.      Comments: Occasionally seems to have some reflux.  They have been drinking a lot more coffee and they will cut this out.  She was asked to see the GI specialist but they did not want to do EGD.    She has had no vomiting no weight loss   Musculoskeletal:         General: No swelling or tenderness.      Cervical back: Normal range of motion and neck supple.      Right lower leg: No edema.      Left lower leg: No edema.   Lymphadenopathy:      Cervical: No cervical adenopathy.   Skin:     Coloration: Skin is not pale.      Findings: No rash.   Neurological:      Mental Status: She is alert. Mental status is at baseline.      Cranial Nerves: No cranial nerve deficit.      Sensory: No sensory deficit.      Motor: No weakness.      Coordination: Coordination normal.      Comments: Patient has a tear of the right earlobe where the earring was pulled out.  Healing nicely at this time    Some intertriginous rash noted in the breast bilaterally   Psychiatric:         Mood and Affect: Mood normal.         Behavior: Behavior  normal.         Judgment: Judgment normal.      Comments: Baseline dementia.  Not putting sentences together well.  Not a good thought process.     Lab on 04/27/2023   Component Date Value Ref Range Status    WBC 04/27/2023 5.8  4.4 - 11.3 x10E9/L Final    nRBC 04/27/2023 0.0  0.0 - 0.0 /100 WBC Final    RBC 04/27/2023 4.57  4.00 - 5.20 x10E12/L Final    Hemoglobin 04/27/2023 13.2  12.0 - 16.0 g/dL Final    Hematocrit 04/27/2023 42.8  36.0 - 46.0 % Final    MCV 04/27/2023 94  80 - 100 fL Final    MCHC 04/27/2023 30.8 (L)  32.0 - 36.0 g/dL Final    Platelets 04/27/2023 334  150 - 450 x10E9/L Final    RDW 04/27/2023 14.7 (H)  11.5 - 14.5 % Final    Neutrophils % 04/27/2023 65.2  40.0 - 80.0 % Final    Immature Granulocytes %, Automated 04/27/2023 0.2  0.0 - 0.9 % Final     Immature Granulocyte Count (IG) includes promyelocytes,    myelocytes and metamyelocytes but does not include bands.   Percent differential counts (%) should be interpreted in the   context of the absolute cell counts (cells/L).    Lymphocytes % 04/27/2023 21.1  13.0 - 44.0 % Final    Monocytes % 04/27/2023 9.2  2.0 - 10.0 % Final    Eosinophils % 04/27/2023 3.1  0.0 - 6.0 % Final    Basophils % 04/27/2023 1.2  0.0 - 2.0 % Final    Neutrophils Absolute 04/27/2023 3.76  1.60 - 5.50 x10E9/L Final    Lymphocytes Absolute 04/27/2023 1.22  0.80 - 3.00 x10E9/L Final    Monocytes Absolute 04/27/2023 0.53  0.05 - 0.80 x10E9/L Final    Eosinophils Absolute 04/27/2023 0.18  0.00 - 0.40 x10E9/L Final    Basophils Absolute 04/27/2023 0.07  0.00 - 0.10 x10E9/L Final    Glucose 04/27/2023 101 (H)  74 - 99 mg/dL Final    Sodium 04/27/2023 143  136 - 145 mmol/L Final    Potassium 04/27/2023 4.5  3.5 - 5.3 mmol/L Final    Chloride 04/27/2023 109 (H)  98 - 107 mmol/L Final    Bicarbonate 04/27/2023 28  21 - 32 mmol/L Final    Anion Gap 04/27/2023 11  10 - 20 mmol/L Final    Urea Nitrogen 04/27/2023 12  6 - 23 mg/dL Final    Creatinine 04/27/2023 0.77  0.50 - 1.05  mg/dL Final    GFR Female 04/27/2023 76  >90 mL/min/1.73m2 Final     CALCULATIONS OF ESTIMATED GFR ARE PERFORMED   USING THE 2021 CKD-EPI STUDY REFIT EQUATION   WITHOUT THE RACE VARIABLE FOR THE IDMS-TRACEABLE   CREATININE METHODS.    https://jasn.asnjournals.org/content/early/2021/09/22/ASN.3591107135    Calcium 04/27/2023 9.6  8.6 - 10.6 mg/dL Final    Albumin 04/27/2023 4.2  3.4 - 5.0 g/dL Final    Alkaline Phosphatase 04/27/2023 96  33 - 136 U/L Final    Total Protein 04/27/2023 6.5  6.4 - 8.2 g/dL Final    AST 04/27/2023 19  9 - 39 U/L Final    Total Bilirubin 04/27/2023 0.6  0.0 - 1.2 mg/dL Final    ALT (SGPT) 04/27/2023 17  7 - 45 U/L Final     Patients treated with Sulfasalazine may generate    falsely decreased results for ALT.    Cholesterol 04/27/2023 167  0 - 199 mg/dL Final    .      AGE      DESIRABLE   BORDERLINE HIGH   HIGH     0-19 Y     0 - 169       170 - 199     >/= 200    20-24 Y     0 - 189       190 - 224     >/= 225         >24 Y     0 - 199       200 - 239     >/= 240   **All ranges are based on fasting samples. Specific   therapeutic targets will vary based on patient-specific   cardiac risk.  .   Pediatric guidelines reference:Pediatrics 2011, 128(S5).   Adult guidelines reference: NCEP ATPIII Guidelines,     MELINDA 2001, 258:2486-97  .   Venipuncture immediately after or during the    administration of Metamizole may lead to falsely   low results. Testing should be performed immediately   prior to Metamizole dosing.    HDL 04/27/2023 64.1  mg/dL Final    .      AGE      VERY LOW   LOW     NORMAL    HIGH       0-19 Y       < 35   < 40     40-45     ----    20-24 Y       ----   < 40       >45     ----      >24 Y       ----   < 40     40-60      >60  .    Cholesterol/HDL Ratio 04/27/2023 2.6   Final    REF VALUES  DESIRABLE  < 3.4  HIGH RISK  > 5.0    LDL 04/27/2023 83  0 - 99 mg/dL Final    .                           NEAR      BORD      AGE      DESIRABLE  OPTIMAL    HIGH     HIGH     VERY  HIGH     0-19 Y     0 - 109     ---    110-129   >/= 130     ----    20-24 Y     0 - 119     ---    120-159   >/= 160     ----      >24 Y     0 -  99   100-129  130-159   160-189     >/=190  .    VLDL 04/27/2023 20  0 - 40 mg/dL Final    Triglycerides 04/27/2023 100  0 - 149 mg/dL Final    .      AGE      DESIRABLE   BORDERLINE HIGH   HIGH     VERY HIGH   0 D-90 D    19 - 174         ----         ----        ----  91 D- 9 Y     0 -  74        75 -  99     >/= 100      ----    10-19 Y     0 -  89        90 - 129     >/= 130      ----    20-24 Y     0 - 114       115 - 149     >/= 150      ----         >24 Y     0 - 149       150 - 199    200- 499    >/= 500  .   Venipuncture immediately after or during the    administration of Metamizole may lead to falsely   low results. Testing should be performed immediately   prior to Metamizole dosing.    TSH 04/27/2023 3.21  0.44 - 3.98 mIU/L Final     TSH testing is performed using different testing    methodology at [facility] than at other    [facility]. Direct result comparisons should    only be made within the same method.    Color, Urine 04/27/2023 YELLOW  STRAW,YELLOW Final    Appearance, Urine 04/27/2023 CLEAR  CLEAR Final    Specific Gravity, Urine 04/27/2023 1.012  1.005 - 1.035 Final    pH, Urine 04/27/2023 6.0  5.0 - 8.0 Final    Protein, Urine 04/27/2023 NEGATIVE  NEGATIVE mg/dL Final    Glucose, Urine 04/27/2023 NEGATIVE  NEGATIVE mg/dL Final    Blood, Urine 04/27/2023 NEGATIVE  NEGATIVE Final    Ketones, Urine 04/27/2023 NEGATIVE  NEGATIVE mg/dL Final    Bilirubin, Urine 04/27/2023 NEGATIVE  NEGATIVE Final    Urobilinogen, Urine 04/27/2023 <2.0  0.0 - 1.9 mg/dL Final    Nitrite, Urine 04/27/2023 NEGATIVE  NEGATIVE Final    Leukocyte Esterase, Urine 04/27/2023 TRACE (A)  NEGATIVE Final    Hemoglobin A1C 04/27/2023 6.0 (A)  % Final         Diagnosis of Diabetes-Adults   Non-Diabetic: < or = 5.6%   Increased risk for developing diabetes:  5.7-6.4%   Diagnostic of diabetes: > or = 6.5%  .       Monitoring of Diabetes                Age (y)     Therapeutic Goal (%)   Adults:          >18           <7.0   Pediatrics:    13-18           <7.5                   7-12           <8.0                   0- 6            7.5-8.5   American Diabetes Association. Diabetes Care 33(S1), Jan 2010.    Estimated Average Glucose 04/27/2023 126  MG/DL Final    Amylase 04/27/2023 40  29 - 103 U/L Final    Lipase 04/27/2023 22  9 - 82 U/L Final     Venipuncture immediately after or during the    administration of Metamizole may lead to falsely   low results. Testing should be performed immediately   prior to Metamizole dosing.    WBC, Urine 04/27/2023 2  0 - 5 /HPF Final    RBC, Urine 04/27/2023 <1  0 - 5 /HPF Final    Squamous Epithelial Cells, Urine 04/27/2023 1  /HPF Final    Mucus, Urine 04/27/2023 1+  /LPF Final     Current Outpatient Medications on File Prior to Visit   Medication Sig Dispense Refill    acetaminophen (Tylenol) 325 mg tablet Take 2 tablets (650 mg) by mouth every 6 hours if needed.      atorvastatin (Lipitor) 10 mg tablet Take 1 tablet (10 mg) by mouth once daily.      calcium carbonate (Tums) 200 mg calcium chewable tablet Tums 200 mg calcium (500 mg) chewable tablet   Take by oral route.      donepezil (Aricept) 10 mg tablet Take 1 tablet (10 mg) by mouth once daily.      famotidine (Pepcid) 20 mg tablet Take 1 tablet (20 mg) by mouth 2 times a day. 180 tablet 1    ketoconazole (NIZOral) 2 % shampoo use to wash affected area on chest every other day. lather  let sit for 3-5 minutes then rinse well      levothyroxine (Synthroid, Levoxyl) 25 mcg tablet TAKE 1 TABLET BY MOUTH  DAILY 90 tablet 3    loratadine (Claritin) 10 mg tablet Take 1 tablet (10 mg) by mouth once daily.      memantine (Namenda) 10 mg tablet TAKE 1 TABLET BY MOUTH  TWICE DAILY 180 tablet 3    multivitamin capsule Take 1 capsule by mouth once daily.      clotrimazole-betamethasone  (Lotrisone) cream APPLY TO THE AFFECTED AND SURROUNDING AREAS OF SKIN BY TOPICAL ROUTE 2 TIMES PER DAY IN THE MORNING AND EVENING FOR 2 WEEKS       No current facility-administered medications on file prior to visit.     No images are attached to the encounter.            Assessment/Plan   Problem List Items Addressed This Visit             ICD-10-CM    Gastroesophageal reflux disease without esophagitis K21.9    Relevant Medications    famotidine (Pepcid) 20 mg tablet    Alzheimer's dementia with behavioral disturbance (CMS/HCC) G30.9, F02.818    Hypercholesterolemia E78.00     Cholesterol levels are at goal reviewed recent cholesterol level with you.  Please continue atorvastatin         Hyperlipidemia E78.5    Relevant Medications    atorvastatin (Lipitor) 10 mg tablet    Hypothyroidism E03.9    Relevant Medications    levothyroxine (Synthroid, Levoxyl) 25 mcg tablet    Osteopenia M85.80    Gastroesophageal reflux disease K21.9     This has been stable we have referred to GI specialist they did not want to do EGD.  Continue conservative therapy.  Please avoid caffeine and eliminate coffee         Dementia (CMS/[facility]) F03.90     Please continue present medical regimen.    Please continue follow-up with neurologist Dr. Roger         Relevant Medications    donepezil (Aricept) 10 mg tablet    memantine (Namenda) 10 mg tablet    Encounter for annual wellness visit (AWV) in Medicare patient - Primary Z00.00     Other Visit Diagnoses         Codes    Need for influenza vaccination     Z23    Relevant Orders    Flu vaccine, quadrivalent, high-dose, preservative free, age 65y+ (FLUZONE) (Completed)    Routine general medical examination at health care facility     Z00.00    Breast cancer screening by mammogram     Z12.31    Relevant Orders    BI mammo bilateral screening tomosynthesis    Urinary frequency     R35.0    Relevant Orders    Urinalysis with Reflex Microscopic (Completed)

## 2023-09-21 NOTE — ASSESSMENT & PLAN NOTE
This has been stable we have referred to GI specialist they did not want to do EGD.  Continue conservative therapy.  Please avoid caffeine and eliminate coffee

## 2023-09-22 ENCOUNTER — LAB (OUTPATIENT)
Dept: LAB | Facility: LAB | Age: 83
End: 2023-09-22
Payer: MEDICARE

## 2023-09-22 DIAGNOSIS — R35.0 URINARY FREQUENCY: ICD-10-CM

## 2023-09-22 PROCEDURE — 81003 URINALYSIS AUTO W/O SCOPE: CPT

## 2023-09-23 LAB
APPEARANCE, URINE: CLEAR
BILIRUBIN, URINE: NEGATIVE
BLOOD, URINE: NEGATIVE
COLOR, URINE: NORMAL
GLUCOSE, URINE: NEGATIVE MG/DL
KETONES, URINE: NEGATIVE MG/DL
LEUKOCYTE ESTERASE, URINE: NEGATIVE
NITRITE, URINE: NEGATIVE
PH, URINE: 6 (ref 5–8)
PROTEIN, URINE: NEGATIVE MG/DL
SPECIFIC GRAVITY, URINE: 1.01 (ref 1–1.03)
UROBILINOGEN, URINE: <2 MG/DL (ref 0–1.9)

## 2023-10-09 ENCOUNTER — ANCILLARY PROCEDURE (OUTPATIENT)
Dept: RADIOLOGY | Facility: CLINIC | Age: 83
End: 2023-10-09
Payer: MEDICARE

## 2023-10-09 DIAGNOSIS — Z12.31 BREAST CANCER SCREENING BY MAMMOGRAM: ICD-10-CM

## 2023-10-09 PROCEDURE — 77063 BREAST TOMOSYNTHESIS BI: CPT | Mod: 50

## 2023-10-10 ENCOUNTER — PATIENT OUTREACH (OUTPATIENT)
Dept: CARE COORDINATION | Facility: CLINIC | Age: 83
End: 2023-10-10
Payer: MEDICARE

## 2023-10-10 DIAGNOSIS — R53.1 GENERALIZED WEAKNESS: ICD-10-CM

## 2023-10-10 NOTE — PROGRESS NOTES
Discharge Facility:[facility]  Discharge Diagnosis:R53.1 Generalized weakness  Admission Date:10/6/23  Discharge Date: 10/9/23    PCP Appointment Date:Task sent to office  Specialist Appointment Date:   Hospital Encounter and Summary: Linked   See discharge assessment below for further details    Engagement  Call Start Time: 0854 (10/10/2023  8:54 AM)    Medications  Medications reviewed with patient/caregiver?: Not applicable (10/10/2023  8:54 AM)  Is the patient having any side effects they believe may be caused by any medication additions or changes?: No (10/10/2023  8:54 AM)  Does the patient have all medications ordered at discharge?: Not applicable (10/10/2023  8:54 AM)  Care Management Interventions: Provided patient education (10/10/2023  8:54 AM)  Is the patient taking all medications as directed (includes completed medication regime)?: Not applicable (10/10/2023  8:54 AM)  Care Management Interventions: Provided patient education (10/10/2023  8:54 AM)  Medication Comments: New new medications at discharge (10/10/2023  8:54 AM)    Appointments  Does the patient have a primary care provider?: Yes (10/10/2023  8:54 AM)  Care Management Interventions: Advised patient to make appointment (10/10/2023  8:54 AM)  Has the patient kept scheduled appointments due by today?: Yes (10/10/2023  8:54 AM)    Self Management  What is the home health agency?: [facility] (10/10/2023  8:54 AM)  Has home health visited the patient within 72 hours of discharge?: Call prior to 72 hours (10/10/2023  8:54 AM)    Patient Teaching  Does the patient have access to their discharge instructions?: Yes (10/10/2023  8:54 AM)  Care Management Interventions: Reviewed instructions with patient (10/10/2023  8:54 AM)  What is the patient's perception of their health status since discharge?: Improving (10/10/2023  8:54 AM)  Is the patient/caregiver able to teach back the hierarchy of who to call/visit for  symptoms/problems? PCP, Specialist, Home Health nurse, Urgent Care, ED, 911: Yes (10/10/2023  8:54 AM)

## 2023-10-17 ENCOUNTER — OFFICE VISIT (OUTPATIENT)
Dept: PRIMARY CARE | Facility: CLINIC | Age: 83
End: 2023-10-17
Payer: MEDICARE

## 2023-10-17 ENCOUNTER — APPOINTMENT (OUTPATIENT)
Dept: LAB | Facility: LAB | Age: 83
End: 2023-10-17
Payer: MEDICARE

## 2023-10-17 VITALS
HEIGHT: 63 IN | OXYGEN SATURATION: 96 % | RESPIRATION RATE: 16 BRPM | HEART RATE: 77 BPM | SYSTOLIC BLOOD PRESSURE: 115 MMHG | DIASTOLIC BLOOD PRESSURE: 79 MMHG | TEMPERATURE: 97.6 F | BODY MASS INDEX: 29.23 KG/M2 | WEIGHT: 165 LBS

## 2023-10-17 DIAGNOSIS — E78.2 MIXED HYPERLIPIDEMIA: ICD-10-CM

## 2023-10-17 DIAGNOSIS — K21.9 GASTROESOPHAGEAL REFLUX DISEASE WITHOUT ESOPHAGITIS: ICD-10-CM

## 2023-10-17 DIAGNOSIS — E03.9 ACQUIRED HYPOTHYROIDISM: ICD-10-CM

## 2023-10-17 DIAGNOSIS — E87.6 HYPOKALEMIA: ICD-10-CM

## 2023-10-17 DIAGNOSIS — E53.8 VITAMIN B12 DEFICIENCY (NON ANEMIC): ICD-10-CM

## 2023-10-17 DIAGNOSIS — R53.1 GENERALIZED WEAKNESS: Primary | ICD-10-CM

## 2023-10-17 DIAGNOSIS — F02.818 ALZHEIMER'S DEMENTIA WITH BEHAVIORAL DISTURBANCE (MULTI): ICD-10-CM

## 2023-10-17 DIAGNOSIS — G30.9 ALZHEIMER'S DEMENTIA WITH BEHAVIORAL DISTURBANCE (MULTI): ICD-10-CM

## 2023-10-17 DIAGNOSIS — E55.9 VITAMIN D DEFICIENCY, UNSPECIFIED: ICD-10-CM

## 2023-10-17 PROBLEM — R79.89 ELEVATED TROPONIN LEVEL: Status: RESOLVED | Noted: 2023-10-07 | Resolved: 2023-10-17

## 2023-10-17 PROBLEM — M72.2 PLANTAR FASCIITIS: Status: RESOLVED | Noted: 2023-04-25 | Resolved: 2023-10-17

## 2023-10-17 PROBLEM — T67.1XXA HEAT SYNCOPE: Status: RESOLVED | Noted: 2023-07-31 | Resolved: 2023-10-17

## 2023-10-17 PROBLEM — E78.00 HYPERCHOLESTEROLEMIA: Status: RESOLVED | Noted: 2018-11-15 | Resolved: 2023-10-17

## 2023-10-17 PROBLEM — R31.29 OTHER MICROSCOPIC HEMATURIA: Status: RESOLVED | Noted: 2023-04-25 | Resolved: 2023-10-17

## 2023-10-17 PROCEDURE — 84443 ASSAY THYROID STIM HORMONE: CPT

## 2023-10-17 PROCEDURE — 96372 THER/PROPH/DIAG INJ SC/IM: CPT | Performed by: FAMILY MEDICINE

## 2023-10-17 PROCEDURE — 1159F MED LIST DOCD IN RCRD: CPT | Performed by: FAMILY MEDICINE

## 2023-10-17 PROCEDURE — 1036F TOBACCO NON-USER: CPT | Performed by: FAMILY MEDICINE

## 2023-10-17 PROCEDURE — 80048 BASIC METABOLIC PNL TOTAL CA: CPT

## 2023-10-17 PROCEDURE — 83735 ASSAY OF MAGNESIUM: CPT

## 2023-10-17 PROCEDURE — 85025 COMPLETE CBC W/AUTO DIFF WBC: CPT

## 2023-10-17 PROCEDURE — 1160F RVW MEDS BY RX/DR IN RCRD: CPT | Performed by: FAMILY MEDICINE

## 2023-10-17 PROCEDURE — 1126F AMNT PAIN NOTED NONE PRSNT: CPT | Performed by: FAMILY MEDICINE

## 2023-10-17 PROCEDURE — 99495 TRANSJ CARE MGMT MOD F2F 14D: CPT | Performed by: FAMILY MEDICINE

## 2023-10-17 PROCEDURE — 36415 COLL VENOUS BLD VENIPUNCTURE: CPT

## 2023-10-17 RX ORDER — ACETAMINOPHEN 500 MG
2000 TABLET ORAL DAILY
COMMUNITY
Start: 2023-10-17

## 2023-10-17 RX ORDER — CYANOCOBALAMIN 1000 UG/ML
1000 INJECTION, SOLUTION INTRAMUSCULAR; SUBCUTANEOUS ONCE
Status: COMPLETED | OUTPATIENT
Start: 2023-10-17 | End: 2023-10-17

## 2023-10-17 RX ORDER — WITCH HAZEL 50 %
2000 PADS, MEDICATED (EA) TOPICAL DAILY
Qty: 30 TABLET | Refills: 11 | COMMUNITY
Start: 2023-10-17 | End: 2024-10-16

## 2023-10-17 RX ADMIN — CYANOCOBALAMIN 1000 MCG: 1000 INJECTION, SOLUTION INTRAMUSCULAR; SUBCUTANEOUS at 14:24

## 2023-10-17 ASSESSMENT — ENCOUNTER SYMPTOMS
SINUS PAIN: 0
MYALGIAS: 0
DYSURIA: 0
CHEST TIGHTNESS: 0
WHEEZING: 0
DIZZINESS: 0
BACK PAIN: 0
FACIAL SWELLING: 0
AGITATION: 0
OCCASIONAL FEELINGS OF UNSTEADINESS: 0
EYE DISCHARGE: 0
SLEEP DISTURBANCE: 0
NERVOUS/ANXIOUS: 0
BRUISES/BLEEDS EASILY: 0
ARTHRALGIAS: 0
VOMITING: 0
ABDOMINAL PAIN: 0
FEVER: 0
APPETITE CHANGE: 0
COUGH: 0
TROUBLE SWALLOWING: 0
SORE THROAT: 0
NAUSEA: 0
DIARRHEA: 0
EYE REDNESS: 0
CHILLS: 0
POLYDIPSIA: 0
SHORTNESS OF BREATH: 0
VOICE CHANGE: 0
BLOOD IN STOOL: 0
JOINT SWELLING: 0
UNEXPECTED WEIGHT CHANGE: 0
DEPRESSION: 0
EYE ITCHING: 0
EYE PAIN: 0
POLYPHAGIA: 0
SINUS PRESSURE: 0
WOUND: 0
FATIGUE: 0
COLOR CHANGE: 0
NECK STIFFNESS: 0
FLANK PAIN: 0
PALPITATIONS: 0
CONSTIPATION: 0
RHINORRHEA: 0
ACTIVITY CHANGE: 0
LOSS OF SENSATION IN FEET: 0
ADENOPATHY: 0
FREQUENCY: 0
DIAPHORESIS: 0
HEMATURIA: 0

## 2023-10-17 ASSESSMENT — PATIENT HEALTH QUESTIONNAIRE - PHQ9
1. LITTLE INTEREST OR PLEASURE IN DOING THINGS: NOT AT ALL
SUM OF ALL RESPONSES TO PHQ9 QUESTIONS 1 AND 2: 0
10. IF YOU CHECKED OFF ANY PROBLEMS, HOW DIFFICULT HAVE THESE PROBLEMS MADE IT FOR YOU TO DO YOUR WORK, TAKE CARE OF THINGS AT HOME, OR GET ALONG WITH OTHER PEOPLE: NOT DIFFICULT AT ALL
2. FEELING DOWN, DEPRESSED OR HOPELESS: NOT AT ALL

## 2023-10-17 NOTE — PROGRESS NOTES
"Subjective   Patient ID: Salina Malik is a 83 y.o. female who presents for [facility] follow up  ([facility] 10/6/23 for weakness and blurred vision after receiving the pneumonia and covid booster).  Today she is accompanied by accompanied by spouse. Patient: Salina Malik  : 1940  PCP: Cirilo Thomason DO  MRN: [id number]  Program: No linked episodes     Salina Malik is a 83 y.o. female presenting today for follow-up after being discharged from the hospital 9 days ago. The main problem requiring admission was weakness and blurred vision due to reaction from covid booster. The discharge summary and/or Transitional Care Management documentation was reviewed. Medication reconciliation was performed as indicated via the \"[place] as Reviewed\" timestamp.     Salina Malik was contacted by Transitional Care Management services two days after her discharge. This encounter and supporting documentation was reviewed.    The complexity of medical decision making for this patient's transitional care is moderate.    Physical Exam  Vitals and nursing note reviewed. Exam conducted with a chaperone present.   Constitutional:       General: She is not in acute distress.     Appearance: Normal appearance. She is normal weight. She is not ill-appearing, toxic-appearing or diaphoretic.   HENT:      Head: Normocephalic.      Right Ear: Tympanic membrane, ear canal and external ear normal.      Left Ear: Tympanic membrane, ear canal and external ear normal.      Nose: Nose normal. No rhinorrhea.      Mouth/Throat:      Mouth: Mucous membranes are moist.      Pharynx: Oropharynx is clear.   Eyes:      Extraocular Movements: Extraocular movements intact.      Conjunctiva/sclera: Conjunctivae normal.      Pupils: Pupils are equal, round, and reactive to light.   Cardiovascular:      Rate and Rhythm: Normal rate and regular rhythm.      Pulses: Normal pulses.      Heart sounds: Normal heart sounds.   Pulmonary:      Effort: " Pulmonary effort is normal. No respiratory distress.      Breath sounds: Normal breath sounds. No wheezing.   Abdominal:      General: Abdomen is flat. Bowel sounds are normal.      Palpations: Abdomen is soft.      Tenderness: There is no abdominal tenderness. There is no guarding or rebound.      Hernia: No hernia is present.   Musculoskeletal:         General: Normal range of motion.      Cervical back: Normal range of motion and neck supple. No rigidity or tenderness.      Right lower leg: No edema.      Left lower leg: No edema.   Lymphadenopathy:      Cervical: No cervical adenopathy.   Skin:     General: Skin is warm and dry.      Capillary Refill: Capillary refill takes less than 2 seconds.   Neurological:      General: No focal deficit present.      Mental Status: She is alert. Mental status is at baseline.      Sensory: No sensory deficit.      Motor: No weakness.      Coordination: Coordination normal.   Psychiatric:         Mood and Affect: Mood normal.         Behavior: Behavior normal.         Assessment/Plan   Problem List Items Addressed This Visit             ICD-10-CM    Gastroesophageal reflux disease without esophagitis K21.9    Alzheimer's dementia with behavioral disturbance (CMS/[facility]) G30.9, F02.818    Mixed hyperlipidemia E78.2    Acquired hypothyroidism E03.9    Generalized weakness - Primary R53.1    Relevant Orders    CBC and Auto Differential    Basic Metabolic Panel    Magnesium    TSH with reflex to Free T4 if abnormal    Hypokalemia E87.6    Relevant Orders    Basic Metabolic Panel     Other Visit Diagnoses       Diagnosis Codes    Vitamin B12 deficiency (non anemic)     E53.8    Relevant Medications    cyanocobalamin (Vitamin B-12) injection 1,000 mcg    cyanocobalamin (Vitamin B-12) 2,000 mcg tablet    Vitamin D deficiency, unspecified     E55.9    Relevant Medications    cholecalciferol (Vitamin D3) 50 mcg (2,000 unit) capsule        F/up in 3 months for prediabetes and medicare  Pe  Blood work today  Call if any new concerns  Vit B12 shot today  Current Outpatient Medications   Medication Sig Dispense Refill    acetaminophen (Tylenol) 325 mg tablet Take 2 tablets (650 mg) by mouth every 6 hours if needed.      atorvastatin (Lipitor) 10 mg tablet Take 1 tablet (10 mg) by mouth once daily. 90 tablet 3    calcium carbonate (Tums) 200 mg calcium chewable tablet Tums 200 mg calcium (500 mg) chewable tablet   Take by oral route.      clotrimazole-betamethasone (Lotrisone) cream APPLY TO THE AFFECTED AND SURROUNDING AREAS OF SKIN BY TOPICAL ROUTE 2 TIMES PER DAY IN THE MORNING AND EVENING FOR 2 WEEKS      donepezil (Aricept) 10 mg tablet Take 1 tablet (10 mg) by mouth once daily. 90 tablet 3    famotidine (Pepcid) 20 mg tablet Take 1 tablet (20 mg) by mouth 2 times a day. 180 tablet 3    ketoconazole (NIZOral) 2 % shampoo use to wash affected area on chest every other day. lather  let sit for 3-5 minutes then rinse well      levothyroxine (Synthroid, Levoxyl) 25 mcg tablet Take 1 tablet (25 mcg) by mouth once daily. 90 tablet 3    loratadine (Claritin) 10 mg tablet Take 1 tablet (10 mg) by mouth once daily.      memantine (Namenda) 10 mg tablet Take 1 tablet (10 mg) by mouth 2 times a day. 180 tablet 3    multivitamin capsule Take 1 capsule by mouth once daily.      cholecalciferol (Vitamin D3) 50 mcg (2,000 unit) capsule Take 1 capsule (2,000 Units) by mouth once daily.      cyanocobalamin (Vitamin B-12) 2,000 mcg tablet Take 1 tablet (2,000 mcg) by mouth once daily. 30 tablet 11     Current Facility-Administered Medications   Medication Dose Route Frequency Provider Last Rate Last Admin    cyanocobalamin (Vitamin B-12) injection 1,000 mcg  1,000 mcg subcutaneous Once Erika Moreno MD             Review of Systems   Constitutional:  Negative for activity change, appetite change, chills, diaphoresis, fatigue, fever and unexpected weight change.   HENT:  Negative for congestion, dental problem,  drooling, ear discharge, ear pain, facial swelling, hearing loss, nosebleeds, postnasal drip, rhinorrhea, sinus pressure, sinus pain, sneezing, sore throat, tinnitus, trouble swallowing and voice change.    Eyes:  Negative for pain, discharge, redness, itching and visual disturbance.   Respiratory:  Negative for cough, chest tightness, shortness of breath and wheezing.    Cardiovascular:  Negative for chest pain, palpitations and leg swelling.   Gastrointestinal:  Negative for abdominal pain, blood in stool, constipation, diarrhea, nausea and vomiting.   Endocrine: Negative for cold intolerance, heat intolerance, polydipsia, polyphagia and polyuria.   Genitourinary:  Negative for decreased urine volume, dysuria, flank pain, frequency, hematuria and urgency.   Musculoskeletal:  Negative for arthralgias, back pain, gait problem, joint swelling, myalgias and neck stiffness.   Skin:  Negative for color change, pallor, rash and wound.   Neurological:  Negative for dizziness.   Hematological:  Negative for adenopathy. Does not bruise/bleed easily.   Psychiatric/Behavioral:  Negative for agitation, behavioral problems and sleep disturbance. The patient is not nervous/anxious.    All other systems reviewed and are negative.      Family History   Problem Relation Name Age of Onset    Dementia Mother      Diabetes type II Father      Diabetes type II Paternal Grandmother         Engagement  Call Start Time: 0854 (10/10/2023  8:54 AM)    Medications  Medications reviewed with patient/caregiver?: Not applicable (10/10/2023  8:54 AM)  Is the patient having any side effects they believe may be caused by any medication additions or changes?: No (10/10/2023  8:54 AM)  Does the patient have all medications ordered at discharge?: Not applicable (10/10/2023  8:54 AM)  Care Management Interventions: Provided patient education (10/10/2023  8:54 AM)  Is the patient taking all medications as directed (includes completed medication  regime)?: Not applicable (10/10/2023  8:54 AM)  Care Management Interventions: Provided patient education (10/10/2023  8:54 AM)  Medication Comments: New new medications at discharge (10/10/2023  8:54 AM)    Appointments  Does the patient have a primary care provider?: Yes (10/10/2023  8:54 AM)  Care Management Interventions: Advised patient to make appointment (10/10/2023  8:54 AM)  Has the patient kept scheduled appointments due by today?: Yes (10/10/2023  8:54 AM)    Self Management  What is the home health agency?: [facility] (10/10/2023  8:54 AM)  Has home health visited the patient within 72 hours of discharge?: Call prior to 72 hours (10/10/2023  8:54 AM)    Patient Teaching  Does the patient have access to their discharge instructions?: Yes (10/10/2023  8:54 AM)  Care Management Interventions: Reviewed instructions with patient (10/10/2023  8:54 AM)  What is the patient's perception of their health status since discharge?: Improving (10/10/2023  8:54 AM)  Is the patient/caregiver able to teach back the hierarchy of who to call/visit for symptoms/problems? PCP, Specialist, Home Health nurse, Urgent Care, ED, 911: Yes (10/10/2023  8:54 AM)        No follow-ups on file.

## 2023-10-17 NOTE — PATIENT INSTRUCTIONS
F/up in 3 months for prediabetes and medicare Pe  Blood work today  Call if any new concerns  Vit B12 shot today             Current Outpatient Medications   Medication Sig Dispense Refill    acetaminophen (Tylenol) 325 mg tablet Take 2 tablets (650 mg) by mouth every 6 hours if needed.        atorvastatin (Lipitor) 10 mg tablet Take 1 tablet (10 mg) by mouth once daily. 90 tablet 3    calcium carbonate (Tums) 200 mg calcium chewable tablet Tums 200 mg calcium (500 mg) chewable tablet   Take by oral route.        clotrimazole-betamethasone (Lotrisone) cream APPLY TO THE AFFECTED AND SURROUNDING AREAS OF SKIN BY TOPICAL ROUTE 2 TIMES PER DAY IN THE MORNING AND EVENING FOR 2 WEEKS        donepezil (Aricept) 10 mg tablet Take 1 tablet (10 mg) by mouth once daily. 90 tablet 3    famotidine (Pepcid) 20 mg tablet Take 1 tablet (20 mg) by mouth 2 times a day. 180 tablet 3    ketoconazole (NIZOral) 2 % shampoo use to wash affected area on chest every other day. lather  let sit for 3-5 minutes then rinse well        levothyroxine (Synthroid, Levoxyl) 25 mcg tablet Take 1 tablet (25 mcg) by mouth once daily. 90 tablet 3    loratadine (Claritin) 10 mg tablet Take 1 tablet (10 mg) by mouth once daily.        memantine (Namenda) 10 mg tablet Take 1 tablet (10 mg) by mouth 2 times a day. 180 tablet 3    multivitamin capsule Take 1 capsule by mouth once daily.        cholecalciferol (Vitamin D3) 50 mcg (2,000 unit) capsule Take 1 capsule (2,000 Units) by mouth once daily.        cyanocobalamin (Vitamin B-12) 2,000 mcg tablet Take 1 tablet (2,000 mcg) by mouth once daily. 30 tablet 11                Current Facility-Administered Medications   Medication Dose Route Frequency Provider Last Rate Last Admin    cyanocobalamin (Vitamin B-12) injection 1,000 mcg  1,000 mcg subcutaneous Once Erika Moreno MD

## 2023-10-18 LAB
ANION GAP SERPL CALC-SCNC: 18 MMOL/L (ref 10–20)
BASOPHILS # BLD AUTO: 0.06 X10*3/UL (ref 0–0.1)
BASOPHILS NFR BLD AUTO: 0.7 %
BUN SERPL-MCNC: 14 MG/DL (ref 6–23)
CALCIUM SERPL-MCNC: 9.6 MG/DL (ref 8.6–10.6)
CHLORIDE SERPL-SCNC: 104 MMOL/L (ref 98–107)
CO2 SERPL-SCNC: 22 MMOL/L (ref 21–32)
CREAT SERPL-MCNC: 0.77 MG/DL (ref 0.5–1.05)
EOSINOPHIL # BLD AUTO: 0.18 X10*3/UL (ref 0–0.4)
EOSINOPHIL NFR BLD AUTO: 2.2 %
ERYTHROCYTE [DISTWIDTH] IN BLOOD BY AUTOMATED COUNT: 14.7 % (ref 11.5–14.5)
GFR SERPL CREATININE-BSD FRML MDRD: 76 ML/MIN/1.73M*2
GLUCOSE SERPL-MCNC: 81 MG/DL (ref 74–99)
HCT VFR BLD AUTO: 42.5 % (ref 36–46)
HGB BLD-MCNC: 13.1 G/DL (ref 12–16)
IMM GRANULOCYTES # BLD AUTO: 0.02 X10*3/UL (ref 0–0.5)
IMM GRANULOCYTES NFR BLD AUTO: 0.2 % (ref 0–0.9)
LYMPHOCYTES # BLD AUTO: 1.64 X10*3/UL (ref 0.8–3)
LYMPHOCYTES NFR BLD AUTO: 19.9 %
MAGNESIUM SERPL-MCNC: 2.86 MG/DL (ref 1.6–2.4)
MCH RBC QN AUTO: 29.4 PG (ref 26–34)
MCHC RBC AUTO-ENTMCNC: 30.8 G/DL (ref 32–36)
MCV RBC AUTO: 95 FL (ref 80–100)
MONOCYTES # BLD AUTO: 0.88 X10*3/UL (ref 0.05–0.8)
MONOCYTES NFR BLD AUTO: 10.7 %
NEUTROPHILS # BLD AUTO: 5.47 X10*3/UL (ref 1.6–5.5)
NEUTROPHILS NFR BLD AUTO: 66.3 %
NRBC BLD-RTO: 0 /100 WBCS (ref 0–0)
PLATELET # BLD AUTO: 379 X10*3/UL (ref 150–450)
PMV BLD AUTO: 10.2 FL (ref 7.5–11.5)
POTASSIUM SERPL-SCNC: 4.4 MMOL/L (ref 3.5–5.3)
RBC # BLD AUTO: 4.46 X10*6/UL (ref 4–5.2)
SODIUM SERPL-SCNC: 140 MMOL/L (ref 136–145)
TSH SERPL-ACNC: 2.13 MIU/L (ref 0.44–3.98)
WBC # BLD AUTO: 8.3 X10*3/UL (ref 4.4–11.3)

## 2023-10-19 NOTE — RESULT ENCOUNTER NOTE
Results are normal except magnesium levels. Needs to cut back on Magnesium to 1/2 of what she is taking. If not on anything then will need to see if she is on any supplements or anything in her diet

## 2023-10-26 ENCOUNTER — PATIENT OUTREACH (OUTPATIENT)
Dept: CARE COORDINATION | Facility: CLINIC | Age: 83
End: 2023-10-26
Payer: MEDICARE

## 2023-10-26 NOTE — PROGRESS NOTES
Call regarding appt. with PCP on 10/17/23 after hospitalization.  At time of outreach call the patient feels as if their condition has improved since last visit. Reviewed the PCP appointment with the pt and addressed any questions or concerns.

## 2023-11-30 ENCOUNTER — PATIENT OUTREACH (OUTPATIENT)
Dept: CARE COORDINATION | Facility: CLINIC | Age: 83
End: 2023-11-30
Payer: MEDICARE

## 2023-11-30 NOTE — PROGRESS NOTES
Call placed regarding one month post discharge follow up call.  At time of outreach call the patient feels as if their condition has improved since initial visit with PCP or specialist. No questions or concerns.

## 2023-12-28 ENCOUNTER — PATIENT OUTREACH (OUTPATIENT)
Dept: CARE COORDINATION | Facility: CLINIC | Age: 83
End: 2023-12-28
Payer: MEDICARE

## 2024-02-12 ENCOUNTER — OFFICE VISIT (OUTPATIENT)
Dept: PRIMARY CARE | Facility: CLINIC | Age: 84
End: 2024-02-12
Payer: MEDICARE

## 2024-02-12 VITALS
RESPIRATION RATE: 16 BRPM | TEMPERATURE: 98.4 F | OXYGEN SATURATION: 96 % | SYSTOLIC BLOOD PRESSURE: 122 MMHG | WEIGHT: 166 LBS | DIASTOLIC BLOOD PRESSURE: 75 MMHG | HEART RATE: 69 BPM | BODY MASS INDEX: 29.41 KG/M2

## 2024-02-12 DIAGNOSIS — J01.10 ACUTE NON-RECURRENT FRONTAL SINUSITIS: Primary | ICD-10-CM

## 2024-02-12 DIAGNOSIS — R09.81 NASAL CONGESTION: ICD-10-CM

## 2024-02-12 PROCEDURE — 1159F MED LIST DOCD IN RCRD: CPT | Performed by: NURSE PRACTITIONER

## 2024-02-12 PROCEDURE — 1036F TOBACCO NON-USER: CPT | Performed by: NURSE PRACTITIONER

## 2024-02-12 PROCEDURE — 1157F ADVNC CARE PLAN IN RCRD: CPT | Performed by: NURSE PRACTITIONER

## 2024-02-12 PROCEDURE — 87637 SARSCOV2&INF A&B&RSV AMP PRB: CPT

## 2024-02-12 PROCEDURE — 99213 OFFICE O/P EST LOW 20 MIN: CPT | Performed by: NURSE PRACTITIONER

## 2024-02-12 PROCEDURE — 1160F RVW MEDS BY RX/DR IN RCRD: CPT | Performed by: NURSE PRACTITIONER

## 2024-02-12 PROCEDURE — 1126F AMNT PAIN NOTED NONE PRSNT: CPT | Performed by: NURSE PRACTITIONER

## 2024-02-12 RX ORDER — AMOXICILLIN AND CLAVULANATE POTASSIUM 875; 125 MG/1; MG/1
875 TABLET, FILM COATED ORAL 2 TIMES DAILY
Qty: 20 TABLET | Refills: 0 | Status: SHIPPED | OUTPATIENT
Start: 2024-02-12 | End: 2024-02-27 | Stop reason: ALTCHOICE

## 2024-02-12 ASSESSMENT — ENCOUNTER SYMPTOMS
SINUS PRESSURE: 1
VOMITING: 0
WHEEZING: 0
COUGH: 1
SHORTNESS OF BREATH: 0
DIARRHEA: 0
RHINORRHEA: 1
LOSS OF SENSATION IN FEET: 0
FEVER: 0
OCCASIONAL FEELINGS OF UNSTEADINESS: 0
CHILLS: 0
NAUSEA: 0
ABDOMINAL PAIN: 0
SORE THROAT: 0
DEPRESSION: 0
SINUS PAIN: 0

## 2024-02-12 ASSESSMENT — PATIENT HEALTH QUESTIONNAIRE - PHQ9
1. LITTLE INTEREST OR PLEASURE IN DOING THINGS: NOT AT ALL
2. FEELING DOWN, DEPRESSED OR HOPELESS: NOT AT ALL
SUM OF ALL RESPONSES TO PHQ9 QUESTIONS 1 AND 2: 0
10. IF YOU CHECKED OFF ANY PROBLEMS, HOW DIFFICULT HAVE THESE PROBLEMS MADE IT FOR YOU TO DO YOUR WORK, TAKE CARE OF THINGS AT HOME, OR GET ALONG WITH OTHER PEOPLE: NOT DIFFICULT AT ALL

## 2024-02-12 NOTE — PATIENT INSTRUCTIONS
We will call you with the results of your viral swab   Antibiotic sent to pharmacy  Advised patient to push fluids and start use of cool mist humidifier  May use claritin   Patient to call if develop new or worsening symptoms

## 2024-02-12 NOTE — ASSESSMENT & PLAN NOTE
Antibiotic sent to pharmacy  Advised patient to push fluids and start use of cool mist humidifier  May use claritin   Patient to call if develop new or worsening symptoms

## 2024-02-12 NOTE — PROGRESS NOTES
Subjective   Chief Complaint: Cough (X 2-3 days) and sneezing.    HPI   Salina Malik is a 83 y.o. female who presents for Cough (X 2-3 days) and sneezing.      Patient startede with runny nose, runny eyes, coughing, sneezing 3 days ago,  is worried about her getting pneumonia as she was diagnosed with a rib fracture and has pain when taking deep breaths and coughing.     They just got back from [place].  had similar symptoms     Patient denies fever, chills, nausea, vomiting, diarrhea, chest pain, heart palpations, or shortness of breath.         Review of Systems   Constitutional:  Negative for chills and fever.   HENT:  Positive for congestion, postnasal drip, rhinorrhea, sinus pressure and sneezing. Negative for sinus pain and sore throat.    Respiratory:  Positive for cough. Negative for shortness of breath and wheezing.    Cardiovascular:  Negative for chest pain.   Gastrointestinal:  Negative for abdominal pain, diarrhea, nausea and vomiting.       Objective   /75   Pulse 69   Temp 36.9 °C (98.4 °F)   Resp 16   Wt 75.3 kg (166 lb)   SpO2 96%   BMI 29.41 kg/m²   BSA Body surface area is 1.83 meters squared.      Physical Exam  Constitutional:       Appearance: Normal appearance.   HENT:      Right Ear: Tympanic membrane normal.      Left Ear: Tympanic membrane normal.      Nose: Congestion present.      Mouth/Throat:      Mouth: Mucous membranes are moist.   Eyes:      Pupils: Pupils are equal, round, and reactive to light.   Cardiovascular:      Rate and Rhythm: Normal rate and regular rhythm.   Pulmonary:      Effort: Pulmonary effort is normal.      Breath sounds: Normal breath sounds.   Abdominal:      General: Abdomen is flat.      Palpations: Abdomen is soft.   Neurological:      Mental Status: She is alert.       Office Visit on 10/17/2023   Component Date Value Ref Range Status    WBC 10/17/2023 8.3  4.4 - 11.3 x10*3/uL Final    nRBC 10/17/2023 0.0  0.0 - 0.0 /100 WBCs  Final    RBC 10/17/2023 4.46  4.00 - 5.20 x10*6/uL Final    Hemoglobin 10/17/2023 13.1  12.0 - 16.0 g/dL Final    Hematocrit 10/17/2023 42.5  36.0 - 46.0 % Final    MCV 10/17/2023 95  80 - 100 fL Final    MCH 10/17/2023 29.4  26.0 - 34.0 pg Final    MCHC 10/17/2023 30.8 (L)  32.0 - 36.0 g/dL Final    RDW 10/17/2023 14.7 (H)  11.5 - 14.5 % Final    Platelets 10/17/2023 379  150 - 450 x10*3/uL Final    MPV 10/17/2023 10.2  7.5 - 11.5 fL Final    Neutrophils % 10/17/2023 66.3  40.0 - 80.0 % Final    Immature Granulocytes %, Automated 10/17/2023 0.2  0.0 - 0.9 % Final    Immature Granulocyte Count (IG) includes promyelocytes, myelocytes and metamyelocytes but does not include bands. Percent differential counts (%) should be interpreted in the context of the absolute cell counts (cells/UL).    Lymphocytes % 10/17/2023 19.9  13.0 - 44.0 % Final    Monocytes % 10/17/2023 10.7  2.0 - 10.0 % Final    Eosinophils % 10/17/2023 2.2  0.0 - 6.0 % Final    Basophils % 10/17/2023 0.7  0.0 - 2.0 % Final    Neutrophils Absolute 10/17/2023 5.47  1.60 - 5.50 x10*3/uL Final    Percent differential counts (%) should be interpreted in the context of the absolute cell counts (cells/uL).    Immature Granulocytes Absolute, Au* 10/17/2023 0.02  0.00 - 0.50 x10*3/uL Final    Lymphocytes Absolute 10/17/2023 1.64  0.80 - 3.00 x10*3/uL Final    Monocytes Absolute 10/17/2023 0.88 (H)  0.05 - 0.80 x10*3/uL Final    Eosinophils Absolute 10/17/2023 0.18  0.00 - 0.40 x10*3/uL Final    Basophils Absolute 10/17/2023 0.06  0.00 - 0.10 x10*3/uL Final    Glucose 10/17/2023 81  74 - 99 mg/dL Final    Sodium 10/17/2023 140  136 - 145 mmol/L Final    Potassium 10/17/2023 4.4  3.5 - 5.3 mmol/L Final    Chloride 10/17/2023 104  98 - 107 mmol/L Final    Bicarbonate 10/17/2023 22  21 - 32 mmol/L Final    Anion Gap 10/17/2023 18  10 - 20 mmol/L Final    Urea Nitrogen 10/17/2023 14  6 - 23 mg/dL Final    Creatinine 10/17/2023 0.77  0.50 - 1.05 mg/dL Final    eGFR  10/17/2023 76  >60 mL/min/1.73m*2 Final    Calculations of estimated GFR are performed using the 2021 CKD-EPI Study Refit equation without the race variable for the IDMS-Traceable creatinine methods.  https://jasn.asnjournals.org/content/early/2021/09/22/ASN.5212970818    Calcium 10/17/2023 9.6  8.6 - 10.6 mg/dL Final    Magnesium 10/17/2023 2.86 (H)  1.60 - 2.40 mg/dL Final    Thyroid Stimulating Hormone 10/17/2023 2.13  0.44 - 3.98 mIU/L Final   Lab on 09/22/2023   Component Date Value Ref Range Status    Color, Urine 09/22/2023 STRAW  STRAW,YELLOW Final    Appearance, Urine 09/22/2023 CLEAR  CLEAR Final    Specific Gravity, Urine 09/22/2023 1.011  1.005 - 1.035 Final    pH, Urine 09/22/2023 6.0  5.0 - 8.0 Final    Protein, Urine 09/22/2023 NEGATIVE  NEGATIVE mg/dL Final    Glucose, Urine 09/22/2023 NEGATIVE  NEGATIVE mg/dL Final    Blood, Urine 09/22/2023 NEGATIVE  NEGATIVE Final    Ketones, Urine 09/22/2023 NEGATIVE  NEGATIVE mg/dL Final    Bilirubin, Urine 09/22/2023 NEGATIVE  NEGATIVE Final    Urobilinogen, Urine 09/22/2023 <2.0  0.0 - 1.9 mg/dL Final    Nitrite, Urine 09/22/2023 NEGATIVE  NEGATIVE Final    Leukocyte Esterase, Urine 09/22/2023 NEGATIVE  NEGATIVE Final   Lab on 04/27/2023   Component Date Value Ref Range Status    WBC 04/27/2023 5.8  4.4 - 11.3 x10E9/L Final    nRBC 04/27/2023 0.0  0.0 - 0.0 /100 WBC Final    RBC 04/27/2023 4.57  4.00 - 5.20 x10E12/L Final    Hemoglobin 04/27/2023 13.2  12.0 - 16.0 g/dL Final    Hematocrit 04/27/2023 42.8  36.0 - 46.0 % Final    MCV 04/27/2023 94  80 - 100 fL Final    MCHC 04/27/2023 30.8 (L)  32.0 - 36.0 g/dL Final    Platelets 04/27/2023 334  150 - 450 x10E9/L Final    RDW 04/27/2023 14.7 (H)  11.5 - 14.5 % Final    Neutrophils % 04/27/2023 65.2  40.0 - 80.0 % Final    Immature Granulocytes %, Automated 04/27/2023 0.2  0.0 - 0.9 % Final     Immature Granulocyte Count (IG) includes promyelocytes,    myelocytes and metamyelocytes but does not include bands.    Percent differential counts (%) should be interpreted in the   context of the absolute cell counts (cells/L).    Lymphocytes % 04/27/2023 21.1  13.0 - 44.0 % Final    Monocytes % 04/27/2023 9.2  2.0 - 10.0 % Final    Eosinophils % 04/27/2023 3.1  0.0 - 6.0 % Final    Basophils % 04/27/2023 1.2  0.0 - 2.0 % Final    Neutrophils Absolute 04/27/2023 3.76  1.60 - 5.50 x10E9/L Final    Lymphocytes Absolute 04/27/2023 1.22  0.80 - 3.00 x10E9/L Final    Monocytes Absolute 04/27/2023 0.53  0.05 - 0.80 x10E9/L Final    Eosinophils Absolute 04/27/2023 0.18  0.00 - 0.40 x10E9/L Final    Basophils Absolute 04/27/2023 0.07  0.00 - 0.10 x10E9/L Final    Glucose 04/27/2023 101 (H)  74 - 99 mg/dL Final    Sodium 04/27/2023 143  136 - 145 mmol/L Final    Potassium 04/27/2023 4.5  3.5 - 5.3 mmol/L Final    Chloride 04/27/2023 109 (H)  98 - 107 mmol/L Final    Bicarbonate 04/27/2023 28  21 - 32 mmol/L Final    Anion Gap 04/27/2023 11  10 - 20 mmol/L Final    Urea Nitrogen 04/27/2023 12  6 - 23 mg/dL Final    Creatinine 04/27/2023 0.77  0.50 - 1.05 mg/dL Final    GFR Female 04/27/2023 76  >90 mL/min/1.73m2 Final     CALCULATIONS OF ESTIMATED GFR ARE PERFORMED   USING THE 2021 CKD-EPI STUDY REFIT EQUATION   WITHOUT THE RACE VARIABLE FOR THE IDMS-TRACEABLE   CREATININE METHODS.    https://jasn.asnjournals.org/content/early/2021/09/22/ASN.5095250713    Calcium 04/27/2023 9.6  8.6 - 10.6 mg/dL Final    Albumin 04/27/2023 4.2  3.4 - 5.0 g/dL Final    Alkaline Phosphatase 04/27/2023 96  33 - 136 U/L Final    Total Protein 04/27/2023 6.5  6.4 - 8.2 g/dL Final    AST 04/27/2023 19  9 - 39 U/L Final    Total Bilirubin 04/27/2023 0.6  0.0 - 1.2 mg/dL Final    ALT (SGPT) 04/27/2023 17  7 - 45 U/L Final     Patients treated with Sulfasalazine may generate    falsely decreased results for ALT.    Cholesterol 04/27/2023 167  0 - 199 mg/dL Final    .      AGE      DESIRABLE   BORDERLINE HIGH   HIGH     0-19 Y     0 - 169       170 - 199     >/= 200     20-24 Y     0 - 189       190 - 224     >/= 225         >24 Y     0 - 199       200 - 239     >/= 240   **All ranges are based on fasting samples. Specific   therapeutic targets will vary based on patient-specific   cardiac risk.  .   Pediatric guidelines reference:Pediatrics 2011, 128(S5).   Adult guidelines reference: NCEP ATPIII Guidelines,     MELINDA 2001, 258:2486-97  .   Venipuncture immediately after or during the    administration of Metamizole may lead to falsely   low results. Testing should be performed immediately   prior to Metamizole dosing.    HDL 04/27/2023 64.1  mg/dL Final    .      AGE      VERY LOW   LOW     NORMAL    HIGH       0-19 Y       < 35   < 40     40-45     ----    20-24 Y       ----   < 40       >45     ----      >24 Y       ----   < 40     40-60      >60  .    Cholesterol/HDL Ratio 04/27/2023 2.6   Final    REF VALUES  DESIRABLE  < 3.4  HIGH RISK  > 5.0    LDL 04/27/2023 83  0 - 99 mg/dL Final    .                           NEAR      BORD      AGE      DESIRABLE  OPTIMAL    HIGH     HIGH     VERY HIGH     0-19 Y     0 - 109     ---    110-129   >/= 130     ----    20-24 Y     0 - 119     ---    120-159   >/= 160     ----      >24 Y     0 -  99   100-129  130-159   160-189     >/=190  .    VLDL 04/27/2023 20  0 - 40 mg/dL Final    Triglycerides 04/27/2023 100  0 - 149 mg/dL Final    .      AGE      DESIRABLE   BORDERLINE HIGH   HIGH     VERY HIGH   0 D-90 D    19 - 174         ----         ----        ----  91 D- 9 Y     0 -  74        75 -  99     >/= 100      ----    10-19 Y     0 -  89        90 - 129     >/= 130      ----    20-24 Y     0 - 114       115 - 149     >/= 150      ----         >24 Y     0 - 149       150 - 199    200- 499    >/= 500  .   Venipuncture immediately after or during the    administration of Metamizole may lead to falsely   low results. Testing should be performed immediately   prior to Metamizole dosing.    TSH 04/27/2023 3.21  0.44 - 3.98 mIU/L Final      TSH testing is performed using different testing    methodology at [facility] than at other    [facility]. Direct result comparisons should    only be made within the same method.    Color, Urine 04/27/2023 YELLOW  STRAW,YELLOW Final    Appearance, Urine 04/27/2023 CLEAR  CLEAR Final    Specific Gravity, Urine 04/27/2023 1.012  1.005 - 1.035 Final    pH, Urine 04/27/2023 6.0  5.0 - 8.0 Final    Protein, Urine 04/27/2023 NEGATIVE  NEGATIVE mg/dL Final    Glucose, Urine 04/27/2023 NEGATIVE  NEGATIVE mg/dL Final    Blood, Urine 04/27/2023 NEGATIVE  NEGATIVE Final    Ketones, Urine 04/27/2023 NEGATIVE  NEGATIVE mg/dL Final    Bilirubin, Urine 04/27/2023 NEGATIVE  NEGATIVE Final    Urobilinogen, Urine 04/27/2023 <2.0  0.0 - 1.9 mg/dL Final    Nitrite, Urine 04/27/2023 NEGATIVE  NEGATIVE Final    Leukocyte Esterase, Urine 04/27/2023 TRACE (A)  NEGATIVE Final    Hemoglobin A1C 04/27/2023 6.0 (A)  % Final         Diagnosis of Diabetes-Adults   Non-Diabetic: < or = 5.6%   Increased risk for developing diabetes: 5.7-6.4%   Diagnostic of diabetes: > or = 6.5%  .       Monitoring of Diabetes                Age (y)     Therapeutic Goal (%)   Adults:          >18           <7.0   Pediatrics:    13-18           <7.5                   7-12           <8.0                   0- 6            7.5-8.5   American Diabetes Association. Diabetes Care 33(S1), Jan 2010.    Estimated Average Glucose 04/27/2023 126  MG/DL Final    Amylase 04/27/2023 40  29 - 103 U/L Final    Lipase 04/27/2023 22  9 - 82 U/L Final     Venipuncture immediately after or during the    administration of Metamizole may lead to falsely   low results. Testing should be performed immediately   prior to Metamizole dosing.    WBC, Urine 04/27/2023 2  0 - 5 /HPF Final    RBC, Urine 04/27/2023 <1  0 - 5 /HPF Final    Squamous Epithelial Cells, Urine 04/27/2023 1  /HPF Final    Mucus, Urine 04/27/2023 1+  /LPF Final     Current Outpatient Medications on  File Prior to Visit   Medication Sig Dispense Refill    acetaminophen (Tylenol) 325 mg tablet Take 2 tablets (650 mg) by mouth every 6 hours if needed.      atorvastatin (Lipitor) 10 mg tablet Take 1 tablet (10 mg) by mouth once daily. 90 tablet 3    calcium carbonate (Tums) 200 mg calcium chewable tablet Tums 200 mg calcium (500 mg) chewable tablet   Take by oral route.      cholecalciferol (Vitamin D3) 50 mcg (2,000 unit) capsule Take 1 capsule (2,000 Units) by mouth once daily.      clotrimazole-betamethasone (Lotrisone) cream APPLY TO THE AFFECTED AND SURROUNDING AREAS OF SKIN BY TOPICAL ROUTE 2 TIMES PER DAY IN THE MORNING AND EVENING FOR 2 WEEKS      cyanocobalamin (Vitamin B-12) 2,000 mcg tablet Take 1 tablet (2,000 mcg) by mouth once daily. 30 tablet 11    donepezil (Aricept) 10 mg tablet Take 1 tablet (10 mg) by mouth once daily. 90 tablet 3    famotidine (Pepcid) 20 mg tablet Take 1 tablet (20 mg) by mouth 2 times a day. 180 tablet 3    ketoconazole (NIZOral) 2 % shampoo use to wash affected area on chest every other day. lather  let sit for 3-5 minutes then rinse well      levothyroxine (Synthroid, Levoxyl) 25 mcg tablet Take 1 tablet (25 mcg) by mouth once daily. 90 tablet 3    loratadine (Claritin) 10 mg tablet Take 1 tablet (10 mg) by mouth once daily.      memantine (Namenda) 10 mg tablet Take 1 tablet (10 mg) by mouth 2 times a day. 180 tablet 3    multivitamin capsule Take 1 capsule by mouth once daily.       No current facility-administered medications on file prior to visit.     No images are attached to the encounter.            Assessment/Plan   Problem List Items Addressed This Visit             ICD-10-CM    Acute non-recurrent frontal sinusitis - Primary J01.10     Antibiotic sent to pharmacy  Advised patient to push fluids and start use of cool mist humidifier  May use claritin   Patient to call if develop new or worsening symptoms            Relevant Medications    amoxicillin-pot  clavulanate (Augmentin) 875-125 mg tablet     Other Visit Diagnoses         Codes    Nasal congestion     R09.81    Relevant Orders    RSV PCR    Sars-CoV-2 PCR    Influenza A, and B PCR

## 2024-02-13 LAB
FLUAV RNA RESP QL NAA+PROBE: NOT DETECTED
FLUBV RNA RESP QL NAA+PROBE: NOT DETECTED
RSV RNA RESP QL NAA+PROBE: NOT DETECTED
SARS-COV-2 RNA RESP QL NAA+PROBE: NOT DETECTED

## 2024-02-21 ENCOUNTER — TELEPHONE (OUTPATIENT)
Dept: PRIMARY CARE | Facility: CLINIC | Age: 84
End: 2024-02-21
Payer: MEDICARE

## 2024-02-21 NOTE — TELEPHONE ENCOUNTER
Please schedule patient for [facility] wellness visit in Fall 2024. Schedule with Christine or Dr. Thomason. Please send this encounter to [facility] for lab orders once scheduled.     Thank you-  Johnnie Chung CMA  2/21/2024  Practice Supervisor  [facility]

## 2024-02-27 ENCOUNTER — OFFICE VISIT (OUTPATIENT)
Dept: PRIMARY CARE | Facility: CLINIC | Age: 84
End: 2024-02-27
Payer: MEDICARE

## 2024-02-27 VITALS
DIASTOLIC BLOOD PRESSURE: 76 MMHG | OXYGEN SATURATION: 96 % | WEIGHT: 165 LBS | BODY MASS INDEX: 29.23 KG/M2 | TEMPERATURE: 96.7 F | HEIGHT: 63 IN | HEART RATE: 72 BPM | SYSTOLIC BLOOD PRESSURE: 122 MMHG

## 2024-02-27 DIAGNOSIS — S22.42XD CLOSED FRACTURE OF MULTIPLE RIBS OF LEFT SIDE WITH ROUTINE HEALING: ICD-10-CM

## 2024-02-27 DIAGNOSIS — Z91.81 AT HIGH RISK FOR FALLS: Primary | ICD-10-CM

## 2024-02-27 DIAGNOSIS — F02.C0 SEVERE LATE ONSET ALZHEIMER'S DEMENTIA, UNSPECIFIED WHETHER BEHAVIORAL, PSYCHOTIC, OR MOOD DISTURBANCE OR ANXIETY (MULTI): ICD-10-CM

## 2024-02-27 DIAGNOSIS — G30.9 ALZHEIMER'S DEMENTIA WITH BEHAVIORAL DISTURBANCE (MULTI): ICD-10-CM

## 2024-02-27 DIAGNOSIS — F02.818 ALZHEIMER'S DEMENTIA WITH BEHAVIORAL DISTURBANCE (MULTI): ICD-10-CM

## 2024-02-27 DIAGNOSIS — G30.1 SEVERE LATE ONSET ALZHEIMER'S DEMENTIA, UNSPECIFIED WHETHER BEHAVIORAL, PSYCHOTIC, OR MOOD DISTURBANCE OR ANXIETY (MULTI): ICD-10-CM

## 2024-02-27 DIAGNOSIS — R53.1 GENERALIZED WEAKNESS: ICD-10-CM

## 2024-02-27 PROCEDURE — 99214 OFFICE O/P EST MOD 30 MIN: CPT | Performed by: FAMILY MEDICINE

## 2024-02-27 PROCEDURE — 1159F MED LIST DOCD IN RCRD: CPT | Performed by: FAMILY MEDICINE

## 2024-02-27 PROCEDURE — 1036F TOBACCO NON-USER: CPT | Performed by: FAMILY MEDICINE

## 2024-02-27 PROCEDURE — 1126F AMNT PAIN NOTED NONE PRSNT: CPT | Performed by: FAMILY MEDICINE

## 2024-02-27 PROCEDURE — 1157F ADVNC CARE PLAN IN RCRD: CPT | Performed by: FAMILY MEDICINE

## 2024-02-27 PROCEDURE — 1160F RVW MEDS BY RX/DR IN RCRD: CPT | Performed by: FAMILY MEDICINE

## 2024-02-27 ASSESSMENT — ENCOUNTER SYMPTOMS
OCCASIONAL FEELINGS OF UNSTEADINESS: 1
RESPIRATORY NEGATIVE: 1
SEIZURES: 0
AGITATION: 0
DIZZINESS: 0
EYE REDNESS: 0
HEADACHES: 0
DEPRESSION: 1
CONFUSION: 1
EYE ITCHING: 0
WEAKNESS: 1
EYES NEGATIVE: 1
DIAPHORESIS: 0
NUMBNESS: 0
APPETITE CHANGE: 0
LOSS OF SENSATION IN FEET: 0

## 2024-02-27 ASSESSMENT — PATIENT HEALTH QUESTIONNAIRE - PHQ9
10. IF YOU CHECKED OFF ANY PROBLEMS, HOW DIFFICULT HAVE THESE PROBLEMS MADE IT FOR YOU TO DO YOUR WORK, TAKE CARE OF THINGS AT HOME, OR GET ALONG WITH OTHER PEOPLE: NOT DIFFICULT AT ALL
SUM OF ALL RESPONSES TO PHQ9 QUESTIONS 1 AND 2: 2
2. FEELING DOWN, DEPRESSED OR HOPELESS: SEVERAL DAYS
1. LITTLE INTEREST OR PLEASURE IN DOING THINGS: SEVERAL DAYS

## 2024-02-27 NOTE — PATIENT INSTRUCTIONS
Overall stable, going to write for physical therapy.    Reviewed reports from outside hospitals.    Going to write for handicap placard.    Ribs appear to be healing well        Ways to Help Prevent Falls at Home    Quick Tips   ? Ask for help if you need it. Most people want to help!   ? Get up slowly after sitting or laying down   ? Wear a medical alert device or keep cell phone in your pocket   ? Use night lights, especially areas near a bathroom   ? Keep the items you use often within reach on a small stool or end table   ? Use an assistive device such as walker or cane, as directed by provider/physical therapy   ? Use a non-slip mat and grab bars in your bathroom. Look for home health sections for best options     Other Areas to Focus On   ? Exercise and nutrition: Regular exercise or taking a falls prevention class are great ways improve strength and balance. Don’t forget to stay hydrated and bring a snack!   ? Medicine side effects: Some medicines can make you sleepy or dizzy, which could cause a fall. Ask your healthcare provider about the side effects your medicines could cause. Be sure to let them know if you take any vitamins or supplements as well.   ? Tripping hazards: Remove items you could trip on, such as loose mats, rugs, cords, and clutter. Wear closed toe shoes with rubber soles.   ? Health and wellness: Get regular checkups with your healthcare provider, plus routine vision and hearing screenings. Talk with your healthcare provider about:   o Your medicines and the possible side effects - bring them in a bag if that is easier!   o Problems with balance or feeling dizzy   o Ways to promote bone health, such as Vitamin D and calcium supplements   o Questions or concerns about falling     *Ask your healthcare team if you have questions     ©[facility], 2022

## 2024-02-27 NOTE — PROGRESS NOTES
Subjective   Patient ID: Salina Malik is a 83 y.o. female who presents for Follow-up (ER visit for fractured ribs).    Used a patch and tylenol.  Super bowl.       patient presents for follow-up from hospital.  Patient presented to the emergency department with chief complaint of diaphoresis witnessed by  earlier this morning.  Patient had a fall 2 days ago from bed apparently landed on the left side some difficulty with certain motions of been noted.  Patient did have episodes of diaphoresis over the last 4 months.    Patient was evaluated in the emergency department.  Patient had some cortical irregularity along the left anterior fourth rib and rib which represent a nondisplaced single site rib fracture.  Patient had EKG performed showed sinus rhythm.  CBC shows mild left shift.  Patient was stable was diagnosed with closed rib fracture.  Patient discharged to home    No focal consolidation noted.    Patient's voicing no complaints at this time.  Does not seem that her ribs hurt.   knows that sometimes she does not  her feet and she is high risk for falls.  She does have a walker but not using it regularly lengthy discussion with the  I do think this would be a good idea anything we can do to prevent falls would be a good idea.    Patient had done quite well to physical with physical therapy prior to leaving for [place].  There was a setback there.    They would like to get back to the physical therapy they also need a handicap placard            Review of Systems   Constitutional:  Negative for appetite change and diaphoresis.   HENT: Negative.     Eyes: Negative.  Negative for redness and itching.   Respiratory: Negative.     Genitourinary: Negative.    Allergic/Immunologic: Negative for food allergies.   Neurological:  Positive for weakness. Negative for dizziness, seizures, syncope, numbness and headaches.   Psychiatric/Behavioral:  Positive for confusion. Negative for  "agitation.        Objective   /76   Pulse 72   Temp 35.9 °C (96.7 °F)   Ht 1.6 m (5' 3\")   Wt 74.8 kg (165 lb)   SpO2 96%   BMI 29.23 kg/m²   BSA Body surface area is 1.82 meters squared.      Physical Exam  Constitutional:       Appearance: Normal appearance.   Cardiovascular:      Rate and Rhythm: Normal rate and regular rhythm.   Pulmonary:      Effort: Pulmonary effort is normal.      Breath sounds: Normal breath sounds.   Musculoskeletal:      Cervical back: No rigidity.   Neurological:      General: No focal deficit present.      Mental Status: She is alert. Mental status is at baseline. She is disoriented.      Motor: No weakness.      Coordination: Coordination normal.      Gait: Gait normal.      Deep Tendon Reflexes: Reflexes normal.      with you today.    Patient gets up on her own from the chair.    She can ambulate with minimal assistance she is able to stand on tiptoes and heels without difficulty.  Office Visit on 02/12/2024   Component Date Value Ref Range Status   • RSV PCR 02/12/2024 Not Detected  Not Detected Final   • Coronavirus 2019, PCR 02/12/2024 Not Detected  Not Detected Final   • Flu A Result 02/12/2024 Not Detected  Not Detected Final   • Flu B Result 02/12/2024 Not Detected  Not Detected Final   Office Visit on 10/17/2023   Component Date Value Ref Range Status   • WBC 10/17/2023 8.3  4.4 - 11.3 x10*3/uL Final   • nRBC 10/17/2023 0.0  0.0 - 0.0 /100 WBCs Final   • RBC 10/17/2023 4.46  4.00 - 5.20 x10*6/uL Final   • Hemoglobin 10/17/2023 13.1  12.0 - 16.0 g/dL Final   • Hematocrit 10/17/2023 42.5  36.0 - 46.0 % Final   • MCV 10/17/2023 95  80 - 100 fL Final   • MCH 10/17/2023 29.4  26.0 - 34.0 pg Final   • MCHC 10/17/2023 30.8 (L)  32.0 - 36.0 g/dL Final   • RDW 10/17/2023 14.7 (H)  11.5 - 14.5 % Final   • Platelets 10/17/2023 379  150 - 450 x10*3/uL Final   • MPV 10/17/2023 10.2  7.5 - 11.5 fL Final   • Neutrophils % 10/17/2023 66.3  40.0 - 80.0 % Final   • Immature " Granulocytes %, Automated 10/17/2023 0.2  0.0 - 0.9 % Final    Immature Granulocyte Count (IG) includes promyelocytes, myelocytes and metamyelocytes but does not include bands. Percent differential counts (%) should be interpreted in the context of the absolute cell counts (cells/UL).   • Lymphocytes % 10/17/2023 19.9  13.0 - 44.0 % Final   • Monocytes % 10/17/2023 10.7  2.0 - 10.0 % Final   • Eosinophils % 10/17/2023 2.2  0.0 - 6.0 % Final   • Basophils % 10/17/2023 0.7  0.0 - 2.0 % Final   • Neutrophils Absolute 10/17/2023 5.47  1.60 - 5.50 x10*3/uL Final    Percent differential counts (%) should be interpreted in the context of the absolute cell counts (cells/uL).   • Immature Granulocytes Absolute, Au* 10/17/2023 0.02  0.00 - 0.50 x10*3/uL Final   • Lymphocytes Absolute 10/17/2023 1.64  0.80 - 3.00 x10*3/uL Final   • Monocytes Absolute 10/17/2023 0.88 (H)  0.05 - 0.80 x10*3/uL Final   • Eosinophils Absolute 10/17/2023 0.18  0.00 - 0.40 x10*3/uL Final   • Basophils Absolute 10/17/2023 0.06  0.00 - 0.10 x10*3/uL Final   • Glucose 10/17/2023 81  74 - 99 mg/dL Final   • Sodium 10/17/2023 140  136 - 145 mmol/L Final   • Potassium 10/17/2023 4.4  3.5 - 5.3 mmol/L Final   • Chloride 10/17/2023 104  98 - 107 mmol/L Final   • Bicarbonate 10/17/2023 22  21 - 32 mmol/L Final   • Anion Gap 10/17/2023 18  10 - 20 mmol/L Final   • Urea Nitrogen 10/17/2023 14  6 - 23 mg/dL Final   • Creatinine 10/17/2023 0.77  0.50 - 1.05 mg/dL Final   • eGFR 10/17/2023 76  >60 mL/min/1.73m*2 Final    Calculations of estimated GFR are performed using the 2021 CKD-EPI Study Refit equation without the race variable for the IDMS-Traceable creatinine methods.  https://jasn.asnjournals.org/content/early/2021/09/22/ASN.7552661427   • Calcium 10/17/2023 9.6  8.6 - 10.6 mg/dL Final   • Magnesium 10/17/2023 2.86 (H)  1.60 - 2.40 mg/dL Final   • Thyroid Stimulating Hormone 10/17/2023 2.13  0.44 - 3.98 mIU/L Final   Lab on 09/22/2023   Component Date  Value Ref Range Status   • Color, Urine 09/22/2023 STRAW  STRAW,YELLOW Final   • Appearance, Urine 09/22/2023 CLEAR  CLEAR Final   • Specific Gravity, Urine 09/22/2023 1.011  1.005 - 1.035 Final   • pH, Urine 09/22/2023 6.0  5.0 - 8.0 Final   • Protein, Urine 09/22/2023 NEGATIVE  NEGATIVE mg/dL Final   • Glucose, Urine 09/22/2023 NEGATIVE  NEGATIVE mg/dL Final   • Blood, Urine 09/22/2023 NEGATIVE  NEGATIVE Final   • Ketones, Urine 09/22/2023 NEGATIVE  NEGATIVE mg/dL Final   • Bilirubin, Urine 09/22/2023 NEGATIVE  NEGATIVE Final   • Urobilinogen, Urine 09/22/2023 <2.0  0.0 - 1.9 mg/dL Final   • Nitrite, Urine 09/22/2023 NEGATIVE  NEGATIVE Final   • Leukocyte Esterase, Urine 09/22/2023 NEGATIVE  NEGATIVE Final   Lab on 04/27/2023   Component Date Value Ref Range Status   • WBC 04/27/2023 5.8  4.4 - 11.3 x10E9/L Final   • nRBC 04/27/2023 0.0  0.0 - 0.0 /100 WBC Final   • RBC 04/27/2023 4.57  4.00 - 5.20 x10E12/L Final   • Hemoglobin 04/27/2023 13.2  12.0 - 16.0 g/dL Final   • Hematocrit 04/27/2023 42.8  36.0 - 46.0 % Final   • MCV 04/27/2023 94  80 - 100 fL Final   • MCHC 04/27/2023 30.8 (L)  32.0 - 36.0 g/dL Final   • Platelets 04/27/2023 334  150 - 450 x10E9/L Final   • RDW 04/27/2023 14.7 (H)  11.5 - 14.5 % Final   • Neutrophils % 04/27/2023 65.2  40.0 - 80.0 % Final   • Immature Granulocytes %, Automated 04/27/2023 0.2  0.0 - 0.9 % Final     Immature Granulocyte Count (IG) includes promyelocytes,    myelocytes and metamyelocytes but does not include bands.   Percent differential counts (%) should be interpreted in the   context of the absolute cell counts (cells/L).   • Lymphocytes % 04/27/2023 21.1  13.0 - 44.0 % Final   • Monocytes % 04/27/2023 9.2  2.0 - 10.0 % Final   • Eosinophils % 04/27/2023 3.1  0.0 - 6.0 % Final   • Basophils % 04/27/2023 1.2  0.0 - 2.0 % Final   • Neutrophils Absolute 04/27/2023 3.76  1.60 - 5.50 x10E9/L Final   • Lymphocytes Absolute 04/27/2023 1.22  0.80 - 3.00 x10E9/L Final   •  Monocytes Absolute 04/27/2023 0.53  0.05 - 0.80 x10E9/L Final   • Eosinophils Absolute 04/27/2023 0.18  0.00 - 0.40 x10E9/L Final   • Basophils Absolute 04/27/2023 0.07  0.00 - 0.10 x10E9/L Final   • Glucose 04/27/2023 101 (H)  74 - 99 mg/dL Final   • Sodium 04/27/2023 143  136 - 145 mmol/L Final   • Potassium 04/27/2023 4.5  3.5 - 5.3 mmol/L Final   • Chloride 04/27/2023 109 (H)  98 - 107 mmol/L Final   • Bicarbonate 04/27/2023 28  21 - 32 mmol/L Final   • Anion Gap 04/27/2023 11  10 - 20 mmol/L Final   • Urea Nitrogen 04/27/2023 12  6 - 23 mg/dL Final   • Creatinine 04/27/2023 0.77  0.50 - 1.05 mg/dL Final   • GFR Female 04/27/2023 76  >90 mL/min/1.73m2 Final     CALCULATIONS OF ESTIMATED GFR ARE PERFORMED   USING THE 2021 CKD-EPI STUDY REFIT EQUATION   WITHOUT THE RACE VARIABLE FOR THE IDMS-TRACEABLE   CREATININE METHODS.    https://jasn.asnjournals.org/content/early/2021/09/22/ASN.9833314699   • Calcium 04/27/2023 9.6  8.6 - 10.6 mg/dL Final   • Albumin 04/27/2023 4.2  3.4 - 5.0 g/dL Final   • Alkaline Phosphatase 04/27/2023 96  33 - 136 U/L Final   • Total Protein 04/27/2023 6.5  6.4 - 8.2 g/dL Final   • AST 04/27/2023 19  9 - 39 U/L Final   • Total Bilirubin 04/27/2023 0.6  0.0 - 1.2 mg/dL Final   • ALT (SGPT) 04/27/2023 17  7 - 45 U/L Final     Patients treated with Sulfasalazine may generate    falsely decreased results for ALT.   • Cholesterol 04/27/2023 167  0 - 199 mg/dL Final    .      AGE      DESIRABLE   BORDERLINE HIGH   HIGH     0-19 Y     0 - 169       170 - 199     >/= 200    20-24 Y     0 - 189       190 - 224     >/= 225         >24 Y     0 - 199       200 - 239     >/= 240   **All ranges are based on fasting samples. Specific   therapeutic targets will vary based on patient-specific   cardiac risk.  .   Pediatric guidelines reference:Pediatrics 2011, 128(S5).   Adult guidelines reference: NCEP ATPIII Guidelines,     MELINDA 2001, 258:2486-97  .   Venipuncture immediately after or during the     administration of Metamizole may lead to falsely   low results. Testing should be performed immediately   prior to Metamizole dosing.   • HDL 04/27/2023 64.1  mg/dL Final    .      AGE      VERY LOW   LOW     NORMAL    HIGH       0-19 Y       < 35   < 40     40-45     ----    20-24 Y       ----   < 40       >45     ----      >24 Y       ----   < 40     40-60      >60  .   • Cholesterol/HDL Ratio 04/27/2023 2.6   Final    REF VALUES  DESIRABLE  < 3.4  HIGH RISK  > 5.0   • LDL 04/27/2023 83  0 - 99 mg/dL Final    .                           NEAR      BORD      AGE      DESIRABLE  OPTIMAL    HIGH     HIGH     VERY HIGH     0-19 Y     0 - 109     ---    110-129   >/= 130     ----    20-24 Y     0 - 119     ---    120-159   >/= 160     ----      >24 Y     0 -  99   100-129  130-159   160-189     >/=190  .   • VLDL 04/27/2023 20  0 - 40 mg/dL Final   • Triglycerides 04/27/2023 100  0 - 149 mg/dL Final    .      AGE      DESIRABLE   BORDERLINE HIGH   HIGH     VERY HIGH   0 D-90 D    19 - 174         ----         ----        ----  91 D- 9 Y     0 -  74        75 -  99     >/= 100      ----    10-19 Y     0 -  89        90 - 129     >/= 130      ----    20-24 Y     0 - 114       115 - 149     >/= 150      ----         >24 Y     0 - 149       150 - 199    200- 499    >/= 500  .   Venipuncture immediately after or during the    administration of Metamizole may lead to falsely   low results. Testing should be performed immediately   prior to Metamizole dosing.   • TSH 04/27/2023 3.21  0.44 - 3.98 mIU/L Final     TSH testing is performed using different testing    methodology at [facility] than at other    [facility]. Direct result comparisons should    only be made within the same method.   • Color, Urine 04/27/2023 YELLOW  STRAW,YELLOW Final   • Appearance, Urine 04/27/2023 CLEAR  CLEAR Final   • Specific Gravity, Urine 04/27/2023 1.012  1.005 - 1.035 Final   • pH, Urine 04/27/2023 6.0  5.0 - 8.0 Final   •  Protein, Urine 04/27/2023 NEGATIVE  NEGATIVE mg/dL Final   • Glucose, Urine 04/27/2023 NEGATIVE  NEGATIVE mg/dL Final   • Blood, Urine 04/27/2023 NEGATIVE  NEGATIVE Final   • Ketones, Urine 04/27/2023 NEGATIVE  NEGATIVE mg/dL Final   • Bilirubin, Urine 04/27/2023 NEGATIVE  NEGATIVE Final   • Urobilinogen, Urine 04/27/2023 <2.0  0.0 - 1.9 mg/dL Final   • Nitrite, Urine 04/27/2023 NEGATIVE  NEGATIVE Final   • Leukocyte Esterase, Urine 04/27/2023 TRACE (A)  NEGATIVE Final   • Hemoglobin A1C 04/27/2023 6.0 (A)  % Final         Diagnosis of Diabetes-Adults   Non-Diabetic: < or = 5.6%   Increased risk for developing diabetes: 5.7-6.4%   Diagnostic of diabetes: > or = 6.5%  .       Monitoring of Diabetes                Age (y)     Therapeutic Goal (%)   Adults:          >18           <7.0   Pediatrics:    13-18           <7.5                   7-12           <8.0                   0- 6            7.5-8.5   American Diabetes Association. Diabetes Care 33(S1), Jan 2010.   • Estimated Average Glucose 04/27/2023 126  MG/DL Final   • Amylase 04/27/2023 40  29 - 103 U/L Final   • Lipase 04/27/2023 22  9 - 82 U/L Final     Venipuncture immediately after or during the    administration of Metamizole may lead to falsely   low results. Testing should be performed immediately   prior to Metamizole dosing.   • WBC, Urine 04/27/2023 2  0 - 5 /HPF Final   • RBC, Urine 04/27/2023 <1  0 - 5 /HPF Final   • Squamous Epithelial Cells, Urine 04/27/2023 1  /HPF Final   • Mucus, Urine 04/27/2023 1+  /LPF Final     Current Outpatient Medications on File Prior to Visit   Medication Sig Dispense Refill   • acetaminophen (Tylenol) 325 mg tablet Take 2 tablets (650 mg) by mouth every 6 hours if needed.     • atorvastatin (Lipitor) 10 mg tablet Take 1 tablet (10 mg) by mouth once daily. 90 tablet 3   • calcium carbonate (Tums) 200 mg calcium chewable tablet Tums 200 mg calcium (500 mg) chewable tablet   Take by oral route.     • cholecalciferol  (Vitamin D3) 50 mcg (2,000 unit) capsule Take 1 capsule (2,000 Units) by mouth once daily.     • clotrimazole-betamethasone (Lotrisone) cream APPLY TO THE AFFECTED AND SURROUNDING AREAS OF SKIN BY TOPICAL ROUTE 2 TIMES PER DAY IN THE MORNING AND EVENING FOR 2 WEEKS     • cyanocobalamin (Vitamin B-12) 2,000 mcg tablet Take 1 tablet (2,000 mcg) by mouth once daily. 30 tablet 11   • donepezil (Aricept) 10 mg tablet Take 1 tablet (10 mg) by mouth once daily. 90 tablet 3   • famotidine (Pepcid) 20 mg tablet Take 1 tablet (20 mg) by mouth 2 times a day. 180 tablet 3   • ketoconazole (NIZOral) 2 % shampoo use to wash affected area on chest every other day. lather  let sit for 3-5 minutes then rinse well     • levothyroxine (Synthroid, Levoxyl) 25 mcg tablet Take 1 tablet (25 mcg) by mouth once daily. 90 tablet 3   • loratadine (Claritin) 10 mg tablet Take 1 tablet (10 mg) by mouth once daily.     • memantine (Namenda) 10 mg tablet Take 1 tablet (10 mg) by mouth 2 times a day. 180 tablet 3   • multivitamin capsule Take 1 capsule by mouth once daily.     • [DISCONTINUED] amoxicillin-pot clavulanate (Augmentin) 875-125 mg tablet Take 1 tablet (875 mg) by mouth 2 times a day for 10 days. 20 tablet 0     No current facility-administered medications on file prior to visit.     No images are attached to the encounter.            Assessment/Plan   Problem List Items Addressed This Visit           ICD-10-CM    Alzheimer's dementia with behavioral disturbance (CMS/HCC) G30.9, F02.818     Stable, following up with neurology         At high risk for falls - Primary Z91.81     I have recommended using a walker or cane    We are going to have physical therapy help         Severe late onset Alzheimer's dementia, unspecified whether behavioral, psychotic, or mood disturbance or anxiety (CMS/HCC) G30.1, F02.C0     About the same on Aricept therapy         Closed fracture of multiple ribs of left side with routine healing S22.42XD     No  pain no discomfort.  Reviewed CAT scan from outside hospital             Patient was identified as a fall risk. Risk prevention instructions provided.

## 2024-05-02 ENCOUNTER — TELEPHONE (OUTPATIENT)
Dept: PRIMARY CARE | Facility: CLINIC | Age: 84
End: 2024-05-02
Payer: MEDICARE

## 2024-05-02 NOTE — TELEPHONE ENCOUNTER
Heather Valero from [facility] called because they received a referral for pt and would like a verbal ok to evaluate for hospice and verbal ok to admit if appropriate.     Heather [phone]

## 2024-05-16 ENCOUNTER — TELEPHONE (OUTPATIENT)
Dept: PRIMARY CARE | Facility: CLINIC | Age: 84
End: 2024-05-16
Payer: MEDICARE

## 2024-05-16 NOTE — TELEPHONE ENCOUNTER
Pt  called in wanting to speak to you about his wife. He is concerned that the memory/hospice facility cut off her memory medication. He feels that they shouldn't have. Pt states they also told him the memory medication is no longer working and he doesn't believe that. Pt also fell and he wants to talk to you about that. Please call pts  Cirilo Malik at Phone: [phone]

## 2024-05-21 ENCOUNTER — TELEPHONE (OUTPATIENT)
Dept: PRIMARY CARE | Facility: CLINIC | Age: 84
End: 2024-05-21
Payer: MEDICARE

## 2024-05-21 NOTE — TELEPHONE ENCOUNTER
Varsha FELDMAN from [facility] called in wanting to speak to you. Se wanted follow up on the conversation you had with the patients . She has some questions regarding pt medications. You can reach her at [phone] and its okay to . She also wanted it to be noted that the pt is in Hospice.

## 2024-05-22 ENCOUNTER — TELEPHONE (OUTPATIENT)
Dept: NEUROLOGY | Facility: CLINIC | Age: 84
End: 2024-05-22
Payer: MEDICARE

## 2024-05-23 PROBLEM — S32.434A: Status: ACTIVE | Noted: 2024-03-31

## 2024-05-23 PROBLEM — D13.1 BENIGN NEOPLASM OF STOMACH: Status: ACTIVE | Noted: 2024-05-23

## 2024-05-23 PROBLEM — R33.9 URINARY RETENTION: Status: ACTIVE | Noted: 2024-04-03
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
## 2024-05-23 RX ORDER — QUETIAPINE FUMARATE 25 MG/1
TABLET, FILM COATED ORAL
COMMUNITY
Start: 2024-04-28

## 2024-05-23 RX ORDER — ASCORBIC ACID 1000 MG
TABLET ORAL
COMMUNITY
Start: 2024-04-29

## 2024-05-23 RX ORDER — PREDNISONE 20 MG/1
TABLET ORAL
COMMUNITY
Start: 2024-05-14

## 2024-05-23 RX ORDER — BISACODYL 10 MG/1
SUPPOSITORY RECTAL
COMMUNITY
Start: 2024-04-29

## 2024-06-12 ENCOUNTER — APPOINTMENT (OUTPATIENT)
Dept: NEUROLOGY | Facility: CLINIC | Age: 84
End: 2024-06-12
Payer: MEDICARE

## 2024-08-01 DIAGNOSIS — E03.9 ACQUIRED HYPOTHYROIDISM: ICD-10-CM

## 2024-08-01 DIAGNOSIS — E78.2 MIXED HYPERLIPIDEMIA: ICD-10-CM

## 2024-09-06 ENCOUNTER — APPOINTMENT (OUTPATIENT)
Dept: PRIMARY CARE | Facility: CLINIC | Age: 84
End: 2024-09-06
Payer: MEDICARE